# Patient Record
Sex: FEMALE | Race: WHITE | NOT HISPANIC OR LATINO
[De-identification: names, ages, dates, MRNs, and addresses within clinical notes are randomized per-mention and may not be internally consistent; named-entity substitution may affect disease eponyms.]

---

## 2017-02-20 ENCOUNTER — RX RENEWAL (OUTPATIENT)
Age: 82
End: 2017-02-20

## 2017-02-20 DIAGNOSIS — Z86.79 PERSONAL HISTORY OF OTHER DISEASES OF THE CIRCULATORY SYSTEM: ICD-10-CM

## 2017-03-02 ENCOUNTER — APPOINTMENT (OUTPATIENT)
Dept: CARDIOLOGY | Facility: CLINIC | Age: 82
End: 2017-03-02

## 2017-03-02 ENCOUNTER — NON-APPOINTMENT (OUTPATIENT)
Age: 82
End: 2017-03-02

## 2017-03-02 VITALS
WEIGHT: 157 LBS | HEIGHT: 60 IN | BODY MASS INDEX: 30.82 KG/M2 | DIASTOLIC BLOOD PRESSURE: 73 MMHG | HEART RATE: 70 BPM | OXYGEN SATURATION: 96 % | SYSTOLIC BLOOD PRESSURE: 114 MMHG

## 2017-03-02 DIAGNOSIS — Z00.00 ENCOUNTER FOR GENERAL ADULT MEDICAL EXAMINATION W/OUT ABNORMAL FINDINGS: ICD-10-CM

## 2017-07-10 ENCOUNTER — APPOINTMENT (OUTPATIENT)
Dept: DERMATOLOGY | Facility: CLINIC | Age: 82
End: 2017-07-10

## 2017-07-10 VITALS
BODY MASS INDEX: 30.82 KG/M2 | WEIGHT: 157 LBS | HEIGHT: 60 IN | SYSTOLIC BLOOD PRESSURE: 138 MMHG | DIASTOLIC BLOOD PRESSURE: 81 MMHG

## 2017-07-10 DIAGNOSIS — Z86.69 PERSONAL HISTORY OF OTHER DISEASES OF THE NERVOUS SYSTEM AND SENSE ORGANS: ICD-10-CM

## 2017-07-10 DIAGNOSIS — D22.9 MELANOCYTIC NEVI, UNSPECIFIED: ICD-10-CM

## 2017-07-10 DIAGNOSIS — Z87.39 PERSONAL HISTORY OF OTHER DISEASES OF THE MUSCULOSKELETAL SYSTEM AND CONNECTIVE TISSUE: ICD-10-CM

## 2017-07-10 DIAGNOSIS — I78.1 NEVUS, NON-NEOPLASTIC: ICD-10-CM

## 2017-07-10 DIAGNOSIS — L81.4 OTHER MELANIN HYPERPIGMENTATION: ICD-10-CM

## 2017-07-10 DIAGNOSIS — Z56.0 UNEMPLOYMENT, UNSPECIFIED: ICD-10-CM

## 2017-07-10 DIAGNOSIS — H54.7 UNSPECIFIED VISUAL LOSS: ICD-10-CM

## 2017-07-10 DIAGNOSIS — Z87.19 PERSONAL HISTORY OF OTHER DISEASES OF THE DIGESTIVE SYSTEM: ICD-10-CM

## 2017-07-10 DIAGNOSIS — L28.0 LICHEN SIMPLEX CHRONICUS: ICD-10-CM

## 2017-07-10 DIAGNOSIS — Z12.83 ENCOUNTER FOR SCREENING FOR MALIGNANT NEOPLASM OF SKIN: ICD-10-CM

## 2017-07-10 RX ORDER — ALOSETRON 0.5 MG/1
0.5 TABLET ORAL
Refills: 0 | Status: ACTIVE | COMMUNITY

## 2017-07-10 RX ORDER — TRIAMCINOLONE ACETONIDE 1 MG/G
0.1 CREAM TOPICAL
Qty: 1 | Refills: 1 | Status: ACTIVE | COMMUNITY
Start: 2017-07-10 | End: 1900-01-01

## 2017-07-10 RX ORDER — MISOPROSTOL 100 UG/1
100 TABLET ORAL
Refills: 0 | Status: ACTIVE | COMMUNITY

## 2017-07-10 RX ORDER — DIPHENOXYLATE HYDROCHLORIDE AND ATROPINE SULFATE 2.5; .025 MG/1; MG/1
TABLET ORAL
Refills: 0 | Status: ACTIVE | COMMUNITY

## 2017-07-10 SDOH — ECONOMIC STABILITY - INCOME SECURITY: UNEMPLOYMENT, UNSPECIFIED: Z56.0

## 2017-09-18 ENCOUNTER — APPOINTMENT (OUTPATIENT)
Dept: CARDIOLOGY | Facility: CLINIC | Age: 82
End: 2017-09-18
Payer: MEDICARE

## 2017-09-18 ENCOUNTER — RX RENEWAL (OUTPATIENT)
Age: 82
End: 2017-09-18

## 2017-09-18 ENCOUNTER — NON-APPOINTMENT (OUTPATIENT)
Age: 82
End: 2017-09-18

## 2017-09-18 VITALS
SYSTOLIC BLOOD PRESSURE: 107 MMHG | OXYGEN SATURATION: 96 % | HEART RATE: 70 BPM | WEIGHT: 150 LBS | BODY MASS INDEX: 29.3 KG/M2 | DIASTOLIC BLOOD PRESSURE: 72 MMHG

## 2017-09-18 PROCEDURE — 99215 OFFICE O/P EST HI 40 MIN: CPT

## 2017-09-18 PROCEDURE — 93000 ELECTROCARDIOGRAM COMPLETE: CPT

## 2017-09-18 RX ORDER — LATANOPROST/PF 0.005 %
0.01 DROPS OPHTHALMIC (EYE)
Qty: 6 | Refills: 0 | Status: ACTIVE | COMMUNITY
Start: 2016-12-13

## 2017-10-18 ENCOUNTER — RX RENEWAL (OUTPATIENT)
Age: 82
End: 2017-10-18

## 2018-04-16 ENCOUNTER — RX RENEWAL (OUTPATIENT)
Age: 83
End: 2018-04-16

## 2018-09-10 ENCOUNTER — NON-APPOINTMENT (OUTPATIENT)
Age: 83
End: 2018-09-10

## 2018-09-10 ENCOUNTER — APPOINTMENT (OUTPATIENT)
Dept: CARDIOLOGY | Facility: CLINIC | Age: 83
End: 2018-09-10
Payer: MEDICARE

## 2018-09-10 VITALS — SYSTOLIC BLOOD PRESSURE: 112 MMHG | DIASTOLIC BLOOD PRESSURE: 72 MMHG | OXYGEN SATURATION: 95 % | HEART RATE: 90 BPM

## 2018-09-10 VITALS — HEART RATE: 72 BPM

## 2018-09-10 PROCEDURE — 93000 ELECTROCARDIOGRAM COMPLETE: CPT

## 2018-09-10 PROCEDURE — 99214 OFFICE O/P EST MOD 30 MIN: CPT

## 2018-10-15 ENCOUNTER — MEDICATION RENEWAL (OUTPATIENT)
Age: 83
End: 2018-10-15

## 2018-12-03 ENCOUNTER — OUTPATIENT (OUTPATIENT)
Dept: OUTPATIENT SERVICES | Facility: HOSPITAL | Age: 83
LOS: 1 days | Discharge: HOME | End: 2018-12-03
Payer: MEDICARE

## 2018-12-03 DIAGNOSIS — Z90.710 ACQUIRED ABSENCE OF BOTH CERVIX AND UTERUS: Chronic | ICD-10-CM

## 2018-12-03 DIAGNOSIS — Z98.89 OTHER SPECIFIED POSTPROCEDURAL STATES: Chronic | ICD-10-CM

## 2018-12-03 DIAGNOSIS — Z96.652 PRESENCE OF LEFT ARTIFICIAL KNEE JOINT: Chronic | ICD-10-CM

## 2018-12-03 DIAGNOSIS — Z98.49 CATARACT EXTRACTION STATUS, UNSPECIFIED EYE: Chronic | ICD-10-CM

## 2018-12-03 PROCEDURE — 90792 PSYCH DIAG EVAL W/MED SRVCS: CPT

## 2018-12-04 DIAGNOSIS — R45.89 OTHER SYMPTOMS AND SIGNS INVOLVING EMOTIONAL STATE: ICD-10-CM

## 2018-12-04 DIAGNOSIS — G31.84 MILD COGNITIVE IMPAIRMENT OF UNCERTAIN OR UNKNOWN ETIOLOGY: ICD-10-CM

## 2018-12-26 ENCOUNTER — EMERGENCY (EMERGENCY)
Facility: HOSPITAL | Age: 83
LOS: 1 days | Discharge: HOME | End: 2018-12-26
Attending: EMERGENCY MEDICINE
Payer: MEDICARE

## 2018-12-26 VITALS
RESPIRATION RATE: 10 BRPM | HEART RATE: 85 BPM | TEMPERATURE: 99 F | DIASTOLIC BLOOD PRESSURE: 93 MMHG | WEIGHT: 139.99 LBS | OXYGEN SATURATION: 98 % | SYSTOLIC BLOOD PRESSURE: 141 MMHG

## 2018-12-26 DIAGNOSIS — Z90.710 ACQUIRED ABSENCE OF BOTH CERVIX AND UTERUS: Chronic | ICD-10-CM

## 2018-12-26 DIAGNOSIS — Z98.49 CATARACT EXTRACTION STATUS, UNSPECIFIED EYE: Chronic | ICD-10-CM

## 2018-12-26 DIAGNOSIS — Z98.89 OTHER SPECIFIED POSTPROCEDURAL STATES: Chronic | ICD-10-CM

## 2018-12-26 DIAGNOSIS — Z96.652 PRESENCE OF LEFT ARTIFICIAL KNEE JOINT: Chronic | ICD-10-CM

## 2018-12-26 LAB
ALBUMIN SERPL ELPH-MCNC: 4.5 G/DL — SIGNIFICANT CHANGE UP (ref 3.3–5)
ALP SERPL-CCNC: 62 U/L — SIGNIFICANT CHANGE UP (ref 40–120)
ALT FLD-CCNC: 11 U/L — SIGNIFICANT CHANGE UP (ref 10–45)
ANION GAP SERPL CALC-SCNC: 13 MMOL/L — SIGNIFICANT CHANGE UP (ref 5–17)
APPEARANCE UR: CLEAR — SIGNIFICANT CHANGE UP
AST SERPL-CCNC: 21 U/L — SIGNIFICANT CHANGE UP (ref 10–40)
BACTERIA # UR AUTO: NEGATIVE — SIGNIFICANT CHANGE UP
BASOPHILS # BLD AUTO: 0.1 K/UL — SIGNIFICANT CHANGE UP (ref 0–0.2)
BASOPHILS NFR BLD AUTO: 1.2 % — SIGNIFICANT CHANGE UP (ref 0–2)
BILIRUB SERPL-MCNC: 0.5 MG/DL — SIGNIFICANT CHANGE UP (ref 0.2–1.2)
BILIRUB UR-MCNC: NEGATIVE — SIGNIFICANT CHANGE UP
BUN SERPL-MCNC: 12 MG/DL — SIGNIFICANT CHANGE UP (ref 7–23)
CALCIUM SERPL-MCNC: 9.8 MG/DL — SIGNIFICANT CHANGE UP (ref 8.4–10.5)
CHLORIDE SERPL-SCNC: 104 MMOL/L — SIGNIFICANT CHANGE UP (ref 96–108)
CK MB BLD-MCNC: 3.2 % — SIGNIFICANT CHANGE UP (ref 0–3.5)
CK MB CFR SERPL CALC: 3.2 NG/ML — SIGNIFICANT CHANGE UP (ref 0–3.8)
CK SERPL-CCNC: 101 U/L — SIGNIFICANT CHANGE UP (ref 25–170)
CO2 SERPL-SCNC: 27 MMOL/L — SIGNIFICANT CHANGE UP (ref 22–31)
COLOR SPEC: SIGNIFICANT CHANGE UP
CREAT SERPL-MCNC: 0.63 MG/DL — SIGNIFICANT CHANGE UP (ref 0.5–1.3)
DIFF PNL FLD: NEGATIVE — SIGNIFICANT CHANGE UP
EOSINOPHIL # BLD AUTO: 0.2 K/UL — SIGNIFICANT CHANGE UP (ref 0–0.5)
EOSINOPHIL NFR BLD AUTO: 2.4 % — SIGNIFICANT CHANGE UP (ref 0–6)
EPI CELLS # UR: 1 /HPF — SIGNIFICANT CHANGE UP
GAS PNL BLDV: SIGNIFICANT CHANGE UP
GLUCOSE SERPL-MCNC: 74 MG/DL — SIGNIFICANT CHANGE UP (ref 70–99)
GLUCOSE UR QL: NEGATIVE — SIGNIFICANT CHANGE UP
HCT VFR BLD CALC: 45.4 % — HIGH (ref 34.5–45)
HGB BLD-MCNC: 13.8 G/DL — SIGNIFICANT CHANGE UP (ref 11.5–15.5)
HYALINE CASTS # UR AUTO: 1 /LPF — SIGNIFICANT CHANGE UP (ref 0–2)
KETONES UR-MCNC: NEGATIVE — SIGNIFICANT CHANGE UP
LEUKOCYTE ESTERASE UR-ACNC: ABNORMAL
LYMPHOCYTES # BLD AUTO: 2.4 K/UL — SIGNIFICANT CHANGE UP (ref 1–3.3)
LYMPHOCYTES # BLD AUTO: 37.1 % — SIGNIFICANT CHANGE UP (ref 13–44)
MCHC RBC-ENTMCNC: 26.6 PG — LOW (ref 27–34)
MCHC RBC-ENTMCNC: 30.4 GM/DL — LOW (ref 32–36)
MCV RBC AUTO: 87.5 FL — SIGNIFICANT CHANGE UP (ref 80–100)
MONOCYTES # BLD AUTO: 0.7 K/UL — SIGNIFICANT CHANGE UP (ref 0–0.9)
MONOCYTES NFR BLD AUTO: 10.3 % — SIGNIFICANT CHANGE UP (ref 2–14)
NEUTROPHILS # BLD AUTO: 3.2 K/UL — SIGNIFICANT CHANGE UP (ref 1.8–7.4)
NEUTROPHILS NFR BLD AUTO: 49 % — SIGNIFICANT CHANGE UP (ref 43–77)
NITRITE UR-MCNC: NEGATIVE — SIGNIFICANT CHANGE UP
PH UR: 5.5 — SIGNIFICANT CHANGE UP (ref 5–8)
PLATELET # BLD AUTO: 205 K/UL — SIGNIFICANT CHANGE UP (ref 150–400)
POTASSIUM SERPL-MCNC: 3.7 MMOL/L — SIGNIFICANT CHANGE UP (ref 3.5–5.3)
POTASSIUM SERPL-SCNC: 3.7 MMOL/L — SIGNIFICANT CHANGE UP (ref 3.5–5.3)
PROT SERPL-MCNC: 8.1 G/DL — SIGNIFICANT CHANGE UP (ref 6–8.3)
PROT UR-MCNC: NEGATIVE — SIGNIFICANT CHANGE UP
RBC # BLD: 5.19 M/UL — SIGNIFICANT CHANGE UP (ref 3.8–5.2)
RBC # FLD: 12.5 % — SIGNIFICANT CHANGE UP (ref 10.3–14.5)
RBC CASTS # UR COMP ASSIST: 1 /HPF — SIGNIFICANT CHANGE UP (ref 0–4)
SODIUM SERPL-SCNC: 144 MMOL/L — SIGNIFICANT CHANGE UP (ref 135–145)
SP GR SPEC: 1.01 — SIGNIFICANT CHANGE UP (ref 1.01–1.02)
TROPONIN T, HIGH SENSITIVITY RESULT: 26 NG/L — SIGNIFICANT CHANGE UP (ref 0–51)
UROBILINOGEN FLD QL: NEGATIVE — SIGNIFICANT CHANGE UP
WBC # BLD: 6.5 K/UL — SIGNIFICANT CHANGE UP (ref 3.8–10.5)
WBC # FLD AUTO: 6.5 K/UL — SIGNIFICANT CHANGE UP (ref 3.8–10.5)
WBC UR QL: 3 /HPF — SIGNIFICANT CHANGE UP (ref 0–5)

## 2018-12-26 PROCEDURE — 99218: CPT

## 2018-12-26 PROCEDURE — 70450 CT HEAD/BRAIN W/O DYE: CPT | Mod: 26

## 2018-12-26 PROCEDURE — 71046 X-RAY EXAM CHEST 2 VIEWS: CPT | Mod: 26

## 2018-12-26 PROCEDURE — 93010 ELECTROCARDIOGRAM REPORT: CPT

## 2018-12-26 NOTE — ED PROVIDER NOTE - ATTENDING CONTRIBUTION TO CARE
89 yo F h/o AFib no blood thinners, HTN presents after syncopal episode this morning while at home cleaning with no injuries noted, no prior syncope, ekg with afib, labs, ct head, likely cdu bed.

## 2018-12-26 NOTE — ED ADULT NURSE NOTE - NSIMPLEMENTINTERV_GEN_ALL_ED
Implemented All Fall with Harm Risk Interventions:  [place] to call system. Call bell, personal items and telephone within reach. Instruct patient to call for assistance. Room bathroom lighting operational. Non-slip footwear when patient is off stretcher. Physically safe environment: no spills, clutter or unnecessary equipment. Stretcher in lowest position, wheels locked, appropriate side rails in place. Provide visual cue, wrist band, yellow gown, etc. Monitor gait and stability. Monitor for mental status changes and reorient to person, place, and time. Review medications for side effects contributing to fall risk. Reinforce activity limits and safety measures with patient and family. Provide visual clues: red socks.

## 2018-12-26 NOTE — ED ADULT NURSE NOTE - OBJECTIVE STATEMENT
Pt bib dtr after a near syncopal event this morning during which she states she felt lightheaded and started to "see black" and went down to floor, striking her left upper arm, but not her head.  She denies actual loss of consciousness.  There is no bruising of the arm.  She is alert and interactive, denies chest pain or respiratory sx prior to event.

## 2018-12-26 NOTE — ED PROVIDER NOTE - OBJECTIVE STATEMENT
89 yo F h/o AFib no blood thinners, HTN presents after syncopal episode this morning while at home cleaning. Pt lives at home alone and fall was unwitnessed. She was able to ambulate down stairs independently to call daughter who brought her to ED. Pt is not currently complaining of fall, no HA, no dizziness, no N/V/D, no abdominal pain, no neck pain, no motor or sensory deficit, no AMS, no gait or balance instability.

## 2018-12-26 NOTE — ED PROVIDER NOTE - PHYSICAL EXAMINATION
A&Ox3 no apparent distress. No head trauma, no bony tenderness throughout. No midline spine tenderness, CN 2-12 grossly intact without focal neurologic defict. No sensory deficit, no motor deficit. PERRLA, EOMI. Heart RRR no murmur. No JVD. L LE edema 2+ pitting. Lungs CTA b/l no wheezes, rhonchi, rales. Abdomen soft nontender nondistended. Peripheral pulses present and equal b/l. Head NCAT. Skin normal for race.

## 2018-12-26 NOTE — ED ADULT NURSE NOTE - PSH
S/P cataract extraction  bilaterally 1996  S/P colon resection  2012 due to SBO  S/P colonoscopic polypectomy  benign 2010  S/P hemorrhoidectomy  1957  S/P hernia surgery  abdomen  S/P hysterectomy with oophorectomy  1975  S/P knee replacement, left  2012

## 2018-12-26 NOTE — ED ADULT NURSE NOTE - PMH
Cataract cortical, senile, bilateral    Diarrhea in adult patient    Hypercholesteremia    Hypertension    IBS (irritable colon syndrome)    Meningitis, pneumococcal  2000  Osteoarthritis    Osteoporosis    Pedal edema    Phlebitis  left leg hx of.  SBO (small bowel obstruction)    Spinal stenosis of lumbar region    Squamous acanthoma of lip    SVT (supraventricular tachycardia)    Vein, varicose  left leg  Ventral hernia

## 2018-12-26 NOTE — ED ADULT NURSE NOTE - CHPI ED NUR SYMPTOMS NEG
no back pain/no congestion/no chest pain/no vomiting/no nausea/no fever/no chills/no shortness of breath

## 2018-12-27 VITALS
OXYGEN SATURATION: 96 % | TEMPERATURE: 98 F | HEART RATE: 63 BPM | SYSTOLIC BLOOD PRESSURE: 175 MMHG | RESPIRATION RATE: 16 BRPM | DIASTOLIC BLOOD PRESSURE: 92 MMHG

## 2018-12-27 LAB
CULTURE RESULTS: NO GROWTH — SIGNIFICANT CHANGE UP
SPECIMEN SOURCE: SIGNIFICANT CHANGE UP
TROPONIN T, HIGH SENSITIVITY RESULT: 29 NG/L — SIGNIFICANT CHANGE UP (ref 0–51)

## 2018-12-27 PROCEDURE — 82553 CREATINE MB FRACTION: CPT

## 2018-12-27 PROCEDURE — 93005 ELECTROCARDIOGRAM TRACING: CPT

## 2018-12-27 PROCEDURE — 84295 ASSAY OF SERUM SODIUM: CPT

## 2018-12-27 PROCEDURE — 93306 TTE W/DOPPLER COMPLETE: CPT

## 2018-12-27 PROCEDURE — 85014 HEMATOCRIT: CPT

## 2018-12-27 PROCEDURE — 93306 TTE W/DOPPLER COMPLETE: CPT | Mod: 26

## 2018-12-27 PROCEDURE — 81001 URINALYSIS AUTO W/SCOPE: CPT

## 2018-12-27 PROCEDURE — 99217: CPT

## 2018-12-27 PROCEDURE — 70450 CT HEAD/BRAIN W/O DYE: CPT

## 2018-12-27 PROCEDURE — 82330 ASSAY OF CALCIUM: CPT

## 2018-12-27 PROCEDURE — 83605 ASSAY OF LACTIC ACID: CPT

## 2018-12-27 PROCEDURE — 82947 ASSAY GLUCOSE BLOOD QUANT: CPT

## 2018-12-27 PROCEDURE — 82435 ASSAY OF BLOOD CHLORIDE: CPT

## 2018-12-27 PROCEDURE — 71046 X-RAY EXAM CHEST 2 VIEWS: CPT

## 2018-12-27 PROCEDURE — 96374 THER/PROPH/DIAG INJ IV PUSH: CPT

## 2018-12-27 PROCEDURE — 96365 THER/PROPH/DIAG IV INF INIT: CPT | Mod: XU

## 2018-12-27 PROCEDURE — 84484 ASSAY OF TROPONIN QUANT: CPT

## 2018-12-27 PROCEDURE — 84132 ASSAY OF SERUM POTASSIUM: CPT

## 2018-12-27 PROCEDURE — 82803 BLOOD GASES ANY COMBINATION: CPT

## 2018-12-27 PROCEDURE — G0378: CPT

## 2018-12-27 PROCEDURE — 85027 COMPLETE CBC AUTOMATED: CPT

## 2018-12-27 PROCEDURE — 99284 EMERGENCY DEPT VISIT MOD MDM: CPT | Mod: 25

## 2018-12-27 PROCEDURE — 82550 ASSAY OF CK (CPK): CPT

## 2018-12-27 PROCEDURE — 80053 COMPREHEN METABOLIC PANEL: CPT

## 2018-12-27 PROCEDURE — 87086 URINE CULTURE/COLONY COUNT: CPT

## 2018-12-27 RX ORDER — ALOSETRON HYDROCHLORIDE 0.5 MG/1
0 TABLET ORAL
Qty: 0 | Refills: 0 | COMMUNITY

## 2018-12-27 RX ORDER — LATANOPROST 0.05 MG/ML
0 SOLUTION/ DROPS OPHTHALMIC; TOPICAL
Qty: 0 | Refills: 0 | COMMUNITY

## 2018-12-27 RX ORDER — LATANOPROST 0.05 MG/ML
1 SOLUTION/ DROPS OPHTHALMIC; TOPICAL AT BEDTIME
Qty: 0 | Refills: 0 | Status: DISCONTINUED | OUTPATIENT
Start: 2018-12-27 | End: 2018-12-30

## 2018-12-27 RX ORDER — DILTIAZEM HCL 120 MG
120 CAPSULE, EXT RELEASE 24 HR ORAL DAILY
Qty: 0 | Refills: 0 | Status: DISCONTINUED | OUTPATIENT
Start: 2018-12-27 | End: 2018-12-30

## 2018-12-27 RX ORDER — ACETAMINOPHEN 500 MG
1000 TABLET ORAL ONCE
Qty: 0 | Refills: 0 | Status: COMPLETED | OUTPATIENT
Start: 2018-12-27 | End: 2018-12-27

## 2018-12-27 RX ORDER — DIPHENOXYLATE HCL/ATROPINE 2.5-.025MG
1 TABLET ORAL
Qty: 0 | Refills: 0 | Status: DISCONTINUED | OUTPATIENT
Start: 2018-12-27 | End: 2018-12-27

## 2018-12-27 RX ADMIN — Medication 1 TABLET(S): at 12:49

## 2018-12-27 RX ADMIN — Medication 1000 MILLIGRAM(S): at 03:15

## 2018-12-27 RX ADMIN — Medication 400 MILLIGRAM(S): at 02:52

## 2018-12-27 RX ADMIN — Medication 120 MILLIGRAM(S): at 06:00

## 2018-12-27 NOTE — ED CDU PROVIDER SUBSEQUENT DAY NOTE - PROGRESS NOTE DETAILS
CDU NOTE FANG FERNANDES: Pt resting comfortably, feeling well without complaint. states pain is improved. NAD, VSS. Sinus rudy on telemetry. will continue monitoring. pt feeling better - no cp, lightheadedness, dizziness, sob, or focal neuro.  pt states having diarrhea this am.  Awaiting ECHO report. CDU NOTE FANG Puri: pt resting comfortably, feels well without complaint. NAD VSS. no events on tele. CDU NOTE FANG Puri: pt resting comfortably, feels well without complaint. NAD recent VSS. no events on tele.  TTE- no acute/emergent findings. pt stable for d/c per Dr. Trinh.

## 2018-12-27 NOTE — ED ADULT NURSE REASSESSMENT NOTE - NS ED NURSE REASSESS COMMENT FT1
0030-patient received alert & oriented x3. OOB to bathroom with little assist. Noted with slow steady gait. Denies headache, dizziness or sob. Labs drawned as ordered.

## 2018-12-27 NOTE — ED CDU PROVIDER DISPOSITION NOTE - CLINICAL COURSE
89 y/o F with PMH AFib (no a/c), HTN, HLD, IBS, SBO, OA, presents after syncopal episode this morning while at home cleaning. Pt lives at home alone and fall was unwitnessed. pt states she felt lightheaded and weak and fell to the floor, states she hit her side and arm, but not her head. states she was awake the whole time, no LOC. pt states she was able to ambulate down stairs independently after the incident to call daughter who brought her to ED. Pt denies H/A, dizziness, LOC, vision changes, CP, SOB, back pain, N/V/D, abdominal pain, neck pain, AMS, gait or balance instability, problems going to the bathroom.  In ED, trop 26 to 29, all other labs unremarkable, CTH negative, CXR negative. pt sent to CDU for telemetry and TTE.   In CDU, delta trop negative and TTE __________. 89 y/o F with PMH AFib (no a/c), HTN, HLD, IBS, SBO, OA, presents after syncopal episode this morning while at home cleaning. Pt lives at home alone and fall was unwitnessed. pt states she felt lightheaded and weak and fell to the floor, states she hit her side and arm, but not her head. states she was awake the whole time, no LOC. pt states she was able to ambulate down stairs independently after the incident to call daughter who brought her to ED. Pt denies H/A, dizziness, LOC, vision changes, CP, SOB, back pain, N/V/D, abdominal pain, neck pain, AMS, gait or balance instability, problems going to the bathroom.  In ED, trop 26 to 29, all other labs unremarkable, CTH negative, CXR negative. pt sent to CDU for telemetry and TTE.   In CDU, delta trop negative and TTE- no acute/emergent findings. no events on tele.

## 2018-12-27 NOTE — ED CDU PROVIDER INITIAL DAY NOTE - DETAILS
Syncope  - frequent re-eval  - vitals q4hrs,  - tele, CE x 2, TTE  - discussed with attending Dr. Morris

## 2018-12-27 NOTE — ED CDU PROVIDER INITIAL DAY NOTE - OBJECTIVE STATEMENT
89 y/o F with PMH AFib (no a/c), HTN, HLD, IBS, SBO, OA, presents after syncopal episode this morning while at home cleaning. Pt lives at home alone and fall was unwitnessed. pt states she felt lightheaded and weak and fell to the floor, states she hit her side and arm, but not her head. states she was awake the whole time, no LOC. pt states she was able to ambulate down stairs independently after the incident to call daughter who brought her to ED. Pt denies H/A, dizziness, LOC, vision changes, CP, SOB, back pain, N/V/D, abdominal pain, neck pain, AMS, gait or balance instability, problems going to the bathroom.  In ED, trop 26 to 29, all other labs unremarkable, CTH negative, CXR negative. pt sent to CDU for telemetry and TTE.     PMD Dr. Mead (not aff.)

## 2018-12-27 NOTE — ED CDU PROVIDER SUBSEQUENT DAY NOTE - HISTORY
No interval changes since initial CDU provider note. Pt feels well aside from some mild-mod L side pain which is worse with palpation. NAD, VSS. Sinus rudy on tele. delta trop negative. no visible bruising, + ttp L lateral/posterior ribs, CXR was negative. will give IV tylenol and continue monitoring. pt to get TTE in AM. Darcy Urena

## 2019-04-18 ENCOUNTER — MEDICATION RENEWAL (OUTPATIENT)
Age: 84
End: 2019-04-18

## 2019-05-02 ENCOUNTER — APPOINTMENT (OUTPATIENT)
Dept: CARDIOLOGY | Facility: CLINIC | Age: 84
End: 2019-05-02
Payer: MEDICARE

## 2019-05-02 ENCOUNTER — NON-APPOINTMENT (OUTPATIENT)
Age: 84
End: 2019-05-02

## 2019-05-02 VITALS
HEIGHT: 60 IN | HEART RATE: 63 BPM | BODY MASS INDEX: 27.48 KG/M2 | SYSTOLIC BLOOD PRESSURE: 111 MMHG | DIASTOLIC BLOOD PRESSURE: 74 MMHG | WEIGHT: 140 LBS | OXYGEN SATURATION: 96 %

## 2019-05-02 PROCEDURE — 99214 OFFICE O/P EST MOD 30 MIN: CPT

## 2019-05-02 PROCEDURE — 93000 ELECTROCARDIOGRAM COMPLETE: CPT

## 2019-05-02 NOTE — REASON FOR VISIT
[Follow-Up - Clinic] : a clinic follow-up of [Atrial Fibrillation] : atrial fibrillation [Hyperlipidemia] : hyperlipidemia [Palpitations] : palpitations [Supraventricular Tachycardia] : supraventricular tachycardia [Hypertension] : hypertension

## 2019-05-02 NOTE — PHYSICAL EXAM
[Well Groomed] : well groomed [Normal Appearance] : normal appearance [General Appearance - Well Nourished] : well nourished [General Appearance - Well Developed] : well developed [No Deformities] : no deformities [General Appearance - In No Acute Distress] : no acute distress [Normal Conjunctiva] : the conjunctiva exhibited no abnormalities [Eyelids - No Xanthelasma] : the eyelids demonstrated no xanthelasmas [Normal Oral Mucosa] : normal oral mucosa [No Oral Pallor] : no oral pallor [Normal Jugular Venous A Waves Present] : normal jugular venous A waves present [No Oral Cyanosis] : no oral cyanosis [No Jugular Venous Pate A Waves] : no jugular venous pate A waves [Normal Jugular Venous V Waves Present] : normal jugular venous V waves present [Auscultation Breath Sounds / Voice Sounds] : lungs were clear to auscultation bilaterally [Exaggerated Use Of Accessory Muscles For Inspiration] : no accessory muscle use [Respiration, Rhythm And Depth] : normal respiratory rhythm and effort [Heart Rate And Rhythm] : heart rate and rhythm were normal [Murmurs] : no murmurs present [Heart Sounds] : normal S1 and S2 [Abdomen Tenderness] : non-tender [Abdomen Soft] : soft [Abdomen Mass (___ Cm)] : no abdominal mass palpated [Gait - Sufficient For Exercise Testing] : the gait was sufficient for exercise testing [Abnormal Walk] : normal gait [Nail Clubbing] : no clubbing of the fingernails [Petechial Hemorrhages (___cm)] : no petechial hemorrhages [Cyanosis, Localized] : no localized cyanosis [Skin Color & Pigmentation] : normal skin color and pigmentation [Skin Lesions] : no skin lesions [] : no rash [No Venous Stasis] : no venous stasis [No Skin Ulcers] : no skin ulcer [No Xanthoma] : no  xanthoma was observed [Mood] : the mood was normal [Affect] : the affect was normal [Oriented To Time, Place, And Person] : oriented to person, place, and time [No Anxiety] : not feeling anxious [FreeTextEntry1] : b/l ankle edema

## 2019-05-02 NOTE — DISCUSSION/SUMMARY
[Risks] : risks [Patient] : the patient [___ Month(s)] : [unfilled] month(s) [With Me] : with me [FreeTextEntry1] : Patient is an 89-year-old female with history of hypertension (medically managed), hyperlipidemia (stble - diet controlled), small bowel obstruction status post colon resection secondary to adhesions, GI Bleed(7/19/2015) - now stable, status post bilateral knee replacement presented to follow up post recent hospitalization for possible atrial flutter with rapid ventricular response - has been iin NSR since then. \par - Patient is presently found to be in normal sinus rhythm. Patient will be continued on the current regimen off of amiodarone and diltiazem. \par - low sodium diet was encouraged\par - patient was referred for routine blood work\par - BP stable\par - ECG with no new changes

## 2019-05-02 NOTE — HISTORY OF PRESENT ILLNESS
[FreeTextEntry1] : Patient is an 89-year-old female with history of hypertension (medically managed), hyperlipidemia (stable - diet controlled), small bowel obstruction status post colon resection secondary to adhesions, GI Bleed(7/19/2015) - now stable, status post bilateral knee replacement presented to follow up post recent hospitalization for possible atrial flutter with rapid ventricular response - has been iin NSR since then. \par \par Patient initially presented to [facility] on July 7, 2014 for right total knee replacement which was initially canceled due to her palpitations, however done later that same day. Patient states that her palpitations were associated with chest discomfort, shortness of breath and mild dizziness. In [place] patient has been undergoing rehabilitation post total knee replacement and was noted to complain of palpitations and sent to the emergency room on July 14. In the emergency room patient was found to be in supraventricular tachycardia and transferred to CCU at [place].\par \par  Of note, post the TKR she was placed on Xarelto for DVT prophylaxis and upon readmission for the SVT had a course c/b GI bleed, transferred to MICU, endoscopy on 7/19/14 revealing a gastric ulcer with clean bases and was treated with IV Protonix. Based upon the discussion with the daughter, patient was medically stabilized on Amiodarone and Diltiazem and no ablation was done. \par \par Today, the patient feels well-denies chest pain, shortness of breath,  dizziness, lightheadedness or syncope. On rare occasions, she experiences some transient "fluttering"  that lasts a second or two only. \par No long lasting palpitations since being placed on Amio and Diltiazem.\par \par Since her last visit, patient underwent an endoscopy and was found to have complete resolution of her gastric ulcer.

## 2019-05-12 ENCOUNTER — INPATIENT (INPATIENT)
Facility: HOSPITAL | Age: 84
LOS: 2 days | Discharge: NURSING, REHAB OR LONG-TERM CARE | End: 2019-05-15
Attending: INTERNAL MEDICINE | Admitting: INTERNAL MEDICINE
Payer: MEDICARE

## 2019-05-12 VITALS
TEMPERATURE: 98 F | OXYGEN SATURATION: 98 % | DIASTOLIC BLOOD PRESSURE: 89 MMHG | HEART RATE: 84 BPM | SYSTOLIC BLOOD PRESSURE: 129 MMHG | HEIGHT: 64 IN | RESPIRATION RATE: 17 BRPM | WEIGHT: 149.91 LBS

## 2019-05-12 DIAGNOSIS — M47.897 OTHER SPONDYLOSIS, LUMBOSACRAL REGION: ICD-10-CM

## 2019-05-12 DIAGNOSIS — M48.061 SPINAL STENOSIS, LUMBAR REGION WITHOUT NEUROGENIC CLAUDICATION: ICD-10-CM

## 2019-05-12 DIAGNOSIS — M53.87 OTHER SPECIFIED DORSOPATHIES, LUMBOSACRAL REGION: ICD-10-CM

## 2019-05-12 DIAGNOSIS — K25.9 GASTRIC ULCER, UNSPECIFIED AS ACUTE OR CHRONIC, WITHOUT HEMORRHAGE OR PERFORATION: ICD-10-CM

## 2019-05-12 DIAGNOSIS — Z90.710 ACQUIRED ABSENCE OF BOTH CERVIX AND UTERUS: Chronic | ICD-10-CM

## 2019-05-12 DIAGNOSIS — Z98.89 OTHER SPECIFIED POSTPROCEDURAL STATES: Chronic | ICD-10-CM

## 2019-05-12 DIAGNOSIS — Z96.652 PRESENCE OF LEFT ARTIFICIAL KNEE JOINT: Chronic | ICD-10-CM

## 2019-05-12 DIAGNOSIS — I10 ESSENTIAL (PRIMARY) HYPERTENSION: ICD-10-CM

## 2019-05-12 DIAGNOSIS — Z98.49 CATARACT EXTRACTION STATUS, UNSPECIFIED EYE: Chronic | ICD-10-CM

## 2019-05-12 DIAGNOSIS — M51.36 OTHER INTERVERTEBRAL DISC DEGENERATION, LUMBAR REGION: ICD-10-CM

## 2019-05-12 LAB
ALBUMIN SERPL ELPH-MCNC: 3.8 G/DL — SIGNIFICANT CHANGE UP (ref 3.3–5)
ALP SERPL-CCNC: 59 U/L — SIGNIFICANT CHANGE UP (ref 40–120)
ALT FLD-CCNC: 11 U/L — LOW (ref 12–78)
ANION GAP SERPL CALC-SCNC: 9 MMOL/L — SIGNIFICANT CHANGE UP (ref 5–17)
APPEARANCE UR: CLEAR — SIGNIFICANT CHANGE UP
AST SERPL-CCNC: 27 U/L — SIGNIFICANT CHANGE UP (ref 15–37)
BACTERIA # UR AUTO: ABNORMAL
BASOPHILS # BLD AUTO: 0.05 K/UL — SIGNIFICANT CHANGE UP (ref 0–0.2)
BASOPHILS NFR BLD AUTO: 0.7 % — SIGNIFICANT CHANGE UP (ref 0–2)
BILIRUB SERPL-MCNC: 1 MG/DL — SIGNIFICANT CHANGE UP (ref 0.2–1.2)
BILIRUB UR-MCNC: NEGATIVE — SIGNIFICANT CHANGE UP
BUN SERPL-MCNC: 16 MG/DL — SIGNIFICANT CHANGE UP (ref 7–23)
CALCIUM SERPL-MCNC: 9.1 MG/DL — SIGNIFICANT CHANGE UP (ref 8.5–10.1)
CHLORIDE SERPL-SCNC: 108 MMOL/L — SIGNIFICANT CHANGE UP (ref 96–108)
CO2 SERPL-SCNC: 27 MMOL/L — SIGNIFICANT CHANGE UP (ref 22–31)
COLOR SPEC: YELLOW — SIGNIFICANT CHANGE UP
CREAT SERPL-MCNC: 0.78 MG/DL — SIGNIFICANT CHANGE UP (ref 0.5–1.3)
DIFF PNL FLD: ABNORMAL
EOSINOPHIL # BLD AUTO: 0.11 K/UL — SIGNIFICANT CHANGE UP (ref 0–0.5)
EOSINOPHIL NFR BLD AUTO: 1.6 % — SIGNIFICANT CHANGE UP (ref 0–6)
ERYTHROCYTE [SEDIMENTATION RATE] IN BLOOD: 12 MM/HR — SIGNIFICANT CHANGE UP (ref 0–20)
GLUCOSE SERPL-MCNC: 83 MG/DL — SIGNIFICANT CHANGE UP (ref 70–99)
GLUCOSE UR QL: NEGATIVE MG/DL — SIGNIFICANT CHANGE UP
HCT VFR BLD CALC: 46.5 % — HIGH (ref 34.5–45)
HGB BLD-MCNC: 15 G/DL — SIGNIFICANT CHANGE UP (ref 11.5–15.5)
IMM GRANULOCYTES NFR BLD AUTO: 0.1 % — SIGNIFICANT CHANGE UP (ref 0–1.5)
KETONES UR-MCNC: NEGATIVE — SIGNIFICANT CHANGE UP
LEUKOCYTE ESTERASE UR-ACNC: ABNORMAL
LYMPHOCYTES # BLD AUTO: 1.8 K/UL — SIGNIFICANT CHANGE UP (ref 1–3.3)
LYMPHOCYTES # BLD AUTO: 26.9 % — SIGNIFICANT CHANGE UP (ref 13–44)
MCHC RBC-ENTMCNC: 28.2 PG — SIGNIFICANT CHANGE UP (ref 27–34)
MCHC RBC-ENTMCNC: 32.3 GM/DL — SIGNIFICANT CHANGE UP (ref 32–36)
MCV RBC AUTO: 87.6 FL — SIGNIFICANT CHANGE UP (ref 80–100)
MONOCYTES # BLD AUTO: 0.76 K/UL — SIGNIFICANT CHANGE UP (ref 0–0.9)
MONOCYTES NFR BLD AUTO: 11.4 % — SIGNIFICANT CHANGE UP (ref 2–14)
NEUTROPHILS # BLD AUTO: 3.95 K/UL — SIGNIFICANT CHANGE UP (ref 1.8–7.4)
NEUTROPHILS NFR BLD AUTO: 59.3 % — SIGNIFICANT CHANGE UP (ref 43–77)
NITRITE UR-MCNC: NEGATIVE — SIGNIFICANT CHANGE UP
NRBC # BLD: 0 /100 WBCS — SIGNIFICANT CHANGE UP (ref 0–0)
PH UR: 7 — SIGNIFICANT CHANGE UP (ref 5–8)
PLATELET # BLD AUTO: 210 K/UL — SIGNIFICANT CHANGE UP (ref 150–400)
POTASSIUM SERPL-MCNC: 3.7 MMOL/L — SIGNIFICANT CHANGE UP (ref 3.5–5.3)
POTASSIUM SERPL-SCNC: 3.7 MMOL/L — SIGNIFICANT CHANGE UP (ref 3.5–5.3)
PROT SERPL-MCNC: 7.7 GM/DL — SIGNIFICANT CHANGE UP (ref 6–8.3)
PROT UR-MCNC: 15 MG/DL
RBC # BLD: 5.31 M/UL — HIGH (ref 3.8–5.2)
RBC # FLD: 13.4 % — SIGNIFICANT CHANGE UP (ref 10.3–14.5)
RBC CASTS # UR COMP ASSIST: SIGNIFICANT CHANGE UP /HPF (ref 0–4)
SODIUM SERPL-SCNC: 144 MMOL/L — SIGNIFICANT CHANGE UP (ref 135–145)
SP GR SPEC: 1 — LOW (ref 1.01–1.02)
UROBILINOGEN FLD QL: NEGATIVE MG/DL — SIGNIFICANT CHANGE UP
WBC # BLD: 6.68 K/UL — SIGNIFICANT CHANGE UP (ref 3.8–10.5)
WBC # FLD AUTO: 6.68 K/UL — SIGNIFICANT CHANGE UP (ref 3.8–10.5)
WBC UR QL: SIGNIFICANT CHANGE UP

## 2019-05-12 PROCEDURE — 72131 CT LUMBAR SPINE W/O DYE: CPT | Mod: 26

## 2019-05-12 PROCEDURE — 99285 EMERGENCY DEPT VISIT HI MDM: CPT

## 2019-05-12 PROCEDURE — 76377 3D RENDER W/INTRP POSTPROCES: CPT | Mod: 26

## 2019-05-12 RX ORDER — KETOROLAC TROMETHAMINE 30 MG/ML
15 SYRINGE (ML) INJECTION ONCE
Refills: 0 | Status: DISCONTINUED | OUTPATIENT
Start: 2019-05-12 | End: 2019-05-12

## 2019-05-12 RX ORDER — FENTANYL CITRATE 50 UG/ML
50 INJECTION INTRAVENOUS ONCE
Refills: 0 | Status: DISCONTINUED | OUTPATIENT
Start: 2019-05-12 | End: 2019-05-12

## 2019-05-12 RX ORDER — DOCUSATE SODIUM 100 MG
100 CAPSULE ORAL THREE TIMES A DAY
Refills: 0 | Status: DISCONTINUED | OUTPATIENT
Start: 2019-05-12 | End: 2019-05-15

## 2019-05-12 RX ORDER — PANTOPRAZOLE SODIUM 20 MG/1
40 TABLET, DELAYED RELEASE ORAL
Refills: 0 | Status: DISCONTINUED | OUTPATIENT
Start: 2019-05-12 | End: 2019-05-15

## 2019-05-12 RX ORDER — DILTIAZEM HCL 120 MG
120 CAPSULE, EXT RELEASE 24 HR ORAL DAILY
Refills: 0 | Status: DISCONTINUED | OUTPATIENT
Start: 2019-05-12 | End: 2019-05-15

## 2019-05-12 RX ORDER — ACETAMINOPHEN 500 MG
1000 TABLET ORAL ONCE
Refills: 0 | Status: COMPLETED | OUTPATIENT
Start: 2019-05-12 | End: 2019-05-12

## 2019-05-12 RX ORDER — ENOXAPARIN SODIUM 100 MG/ML
40 INJECTION SUBCUTANEOUS DAILY
Refills: 0 | Status: DISCONTINUED | OUTPATIENT
Start: 2019-05-12 | End: 2019-05-15

## 2019-05-12 RX ORDER — IBUPROFEN 200 MG
600 TABLET ORAL
Refills: 0 | Status: DISCONTINUED | OUTPATIENT
Start: 2019-05-12 | End: 2019-05-15

## 2019-05-12 RX ORDER — LATANOPROST 0.05 MG/ML
1 SOLUTION/ DROPS OPHTHALMIC; TOPICAL AT BEDTIME
Refills: 0 | Status: DISCONTINUED | OUTPATIENT
Start: 2019-05-12 | End: 2019-05-15

## 2019-05-12 RX ORDER — LATANOPROST 0.05 MG/ML
1 SOLUTION/ DROPS OPHTHALMIC; TOPICAL
Qty: 0 | Refills: 0 | DISCHARGE

## 2019-05-12 RX ORDER — DICLOFENAC SODIUM 75 MG/1
0 TABLET, DELAYED RELEASE ORAL
Qty: 0 | Refills: 0 | DISCHARGE

## 2019-05-12 RX ORDER — MORPHINE SULFATE 50 MG/1
2 CAPSULE, EXTENDED RELEASE ORAL EVERY 6 HOURS
Refills: 0 | Status: DISCONTINUED | OUTPATIENT
Start: 2019-05-12 | End: 2019-05-13

## 2019-05-12 RX ADMIN — Medication 600 MILLIGRAM(S): at 17:37

## 2019-05-12 RX ADMIN — MORPHINE SULFATE 2 MILLIGRAM(S): 50 CAPSULE, EXTENDED RELEASE ORAL at 20:26

## 2019-05-12 RX ADMIN — FENTANYL CITRATE 50 MICROGRAM(S): 50 INJECTION INTRAVENOUS at 09:43

## 2019-05-12 RX ADMIN — Medication 1000 MILLIGRAM(S): at 10:00

## 2019-05-12 RX ADMIN — LATANOPROST 1 DROP(S): 0.05 SOLUTION/ DROPS OPHTHALMIC; TOPICAL at 22:59

## 2019-05-12 RX ADMIN — Medication 600 MILLIGRAM(S): at 18:30

## 2019-05-12 RX ADMIN — Medication 1000 MILLIGRAM(S): at 09:48

## 2019-05-12 RX ADMIN — MORPHINE SULFATE 2 MILLIGRAM(S): 50 CAPSULE, EXTENDED RELEASE ORAL at 19:56

## 2019-05-12 RX ADMIN — Medication 1 TABLET(S): at 13:14

## 2019-05-12 RX ADMIN — ENOXAPARIN SODIUM 40 MILLIGRAM(S): 100 INJECTION SUBCUTANEOUS at 13:15

## 2019-05-12 RX ADMIN — Medication 100 MILLIGRAM(S): at 22:19

## 2019-05-12 RX ADMIN — Medication 15 MILLIGRAM(S): at 07:50

## 2019-05-12 RX ADMIN — Medication 15 MILLIGRAM(S): at 08:20

## 2019-05-12 RX ADMIN — MORPHINE SULFATE 2 MILLIGRAM(S): 50 CAPSULE, EXTENDED RELEASE ORAL at 13:15

## 2019-05-12 RX ADMIN — Medication 4 MILLIGRAM(S): at 07:50

## 2019-05-12 RX ADMIN — Medication 24 MILLIGRAM(S): at 17:37

## 2019-05-12 RX ADMIN — Medication 400 MILLIGRAM(S): at 09:02

## 2019-05-12 RX ADMIN — FENTANYL CITRATE 50 MICROGRAM(S): 50 INJECTION INTRAVENOUS at 13:48

## 2019-05-12 NOTE — H&P ADULT - NSICDXPASTMEDICALHX_GEN_ALL_CORE_FT
PAST MEDICAL HISTORY:  Cataract cortical, senile, bilateral     Diarrhea in adult patient     Hypercholesteremia     Hypertension     IBS (irritable colon syndrome)     Meningitis, pneumococcal 2000    Osteoarthritis     Osteoporosis     Pedal edema     Phlebitis left leg hx of.    SBO (small bowel obstruction)     Spinal stenosis of lumbar region     Squamous acanthoma of lip     SVT (supraventricular tachycardia)     Vein, varicose left leg    Ventral hernia

## 2019-05-12 NOTE — ED PROVIDER NOTE - CLINICAL SUMMARY MEDICAL DECISION MAKING FREE TEXT BOX
LOW back pain with sciatica features refractory to pain meds and in need of pain control and rehab/PT

## 2019-05-12 NOTE — ED PROVIDER NOTE - PHYSICAL EXAMINATION
[place]:  General: No distress.  Mentation at baseline.   HEENT: WNL  Chest/Lungs: CTAB, No wheeze, No retractions, No increased work of breathing, Normal rate  Heart: S1S2 RRR, No M/R/G, Pules equal Bilaterally in upper and lower extremities distally  Abd: soft, NT/ND, No guarding, No rebound.  No hernias, no palpable masses.  Extrem: FROM in all joints, no significant edema noted, No ulcers.  Cap refil < 2sec.  Skin: No rash noted, warm dry.  Neuro:  Grossly normal.  No difficulty ambulating. No focal deficits.  Psychiatric: No evidence of delusions. No SI/HI.

## 2019-05-12 NOTE — H&P ADULT - NSHPPHYSICALEXAM_GEN_ALL_CORE
PHYSICAL EXAM:  GENERAL: NAD, well-groomed, well-developed  HEAD:  Atraumatic, Normocephalic  EYES: EOMI, PERRLA, conjunctiva and sclera clear  ENMT: No tonsillar erythema, exudates, or enlargement; Moist mucous membranes, No lesions  NECK: Supple, No JVD, Normal thyroid  NERVOUS SYSTEM:  Alert & Oriented X3; Motor Strength 4/5 LLE.  + L SLR  CHEST/LUNG: Clear bilaterally; No rales, rhonchi, wheezing, or rubs  HEART: Regular rate and rhythm; No murmurs, rubs, or gallops  ABDOMEN: Soft, Nontender, Nondistended; Bowel sounds present  EXTREMITIES:  2+ Peripheral Pulses, No clubbing, cyanosis, or edema  Back;  Tenderness L-S spine.  LYMPH: No lymphadenopathy noted  SKIN: No rashes or lesions

## 2019-05-12 NOTE — H&P ADULT - NSICDXPASTSURGICALHX_GEN_ALL_CORE_FT
PAST SURGICAL HISTORY:  S/P cataract extraction bilaterally 1996    S/P colon resection 2012 due to SBO    S/P colonoscopic polypectomy benign 2010    S/P hemorrhoidectomy 1957    S/P hernia surgery abdomen    S/P hysterectomy with oophorectomy 1975    S/P knee replacement, left 2012

## 2019-05-12 NOTE — PATIENT PROFILE ADULT - NSPROHMCARDIOMGMTSTRAT_GEN_A_NUR
coping strategies/medication therapy/routine screening/weight management/adequate rest/diet modification/exercise

## 2019-05-12 NOTE — H&P ADULT - NSHPLABSRESULTS_GEN_ALL_CORE
LABS:                        15.0   6.68  )-----------( 210      ( 12 May 2019 07:52 )             46.5     05-12    144  |  108  |  16  ----------------------------<  83  3.7   |  27  |  0.78    Ca    9.1      12 May 2019 07:52    TPro  7.7  /  Alb  3.8  /  TBili  1.0  /  DBili  x   /  AST  27  /  ALT  11<L>  /  AlkPhos  59  05-12      Urinalysis Basic - ( 12 May 2019 09:05 )    Color: Yellow / Appearance: Clear / S.005 / pH: x  Gluc: x / Ketone: Negative  / Bili: Negative / Urobili: Negative mg/dL   Blood: x / Protein: 15 mg/dL / Nitrite: Negative   Leuk Esterase: Trace / RBC: 0-2 /HPF / WBC 0-2   Sq Epi: x / Non Sq Epi: x / Bacteria: Few      CAPILLARY BLOOD GLUCOSE    < from: CT Lumbar Spine No Cont (19 @ 08:23) >      Diffuse multilevel degenerative changes with osteopeniaas described   above multilevel canal and foraminal narrowing.    If symptoms persist, and additional imaging evaluation is needed,   follow-up MRI is suggested.      < end of copied text >

## 2019-05-12 NOTE — H&P ADULT - HISTORY OF PRESENT ILLNESS
89yo, WF with h/o HTN, DDD, OA presents with c/o increasing L sided LBP for 3 days, sharp radiating down L leg.  Associated with weakness and unable to ambulate.  Denies CP, SOB, cough, palpitation, n/v/d, fever, chills, abdominal pain, dysuria.

## 2019-05-12 NOTE — ED ADULT TRIAGE NOTE - CHIEF COMPLAINT QUOTE
Patient reports "Left hip, buttock pain radiating down left leg. Pain started Friday and has not subsided." denies falls. Denies injury or trauma.

## 2019-05-12 NOTE — ED ADULT NURSE NOTE - OBJECTIVE STATEMENT
pt A&Ox3 with complaints of left hip pain radiating to the knee. that started 3 days ago. PMH doug Morelos

## 2019-05-13 PROCEDURE — 72148 MRI LUMBAR SPINE W/O DYE: CPT | Mod: 26

## 2019-05-13 RX ORDER — OXYCODONE AND ACETAMINOPHEN 5; 325 MG/1; MG/1
1 TABLET ORAL EVERY 4 HOURS
Refills: 0 | Status: DISCONTINUED | OUTPATIENT
Start: 2019-05-13 | End: 2019-05-15

## 2019-05-13 RX ADMIN — Medication 100 MILLIGRAM(S): at 13:06

## 2019-05-13 RX ADMIN — OXYCODONE AND ACETAMINOPHEN 1 TABLET(S): 5; 325 TABLET ORAL at 14:06

## 2019-05-13 RX ADMIN — Medication 4 MILLIGRAM(S): at 07:25

## 2019-05-13 RX ADMIN — Medication 600 MILLIGRAM(S): at 05:46

## 2019-05-13 RX ADMIN — Medication 600 MILLIGRAM(S): at 17:06

## 2019-05-13 RX ADMIN — Medication 100 MILLIGRAM(S): at 21:37

## 2019-05-13 RX ADMIN — Medication 4 MILLIGRAM(S): at 13:06

## 2019-05-13 RX ADMIN — Medication 100 MILLIGRAM(S): at 05:16

## 2019-05-13 RX ADMIN — ENOXAPARIN SODIUM 40 MILLIGRAM(S): 100 INJECTION SUBCUTANEOUS at 11:06

## 2019-05-13 RX ADMIN — Medication 600 MILLIGRAM(S): at 05:16

## 2019-05-13 RX ADMIN — Medication 30 MILLILITER(S): at 00:51

## 2019-05-13 RX ADMIN — Medication 120 MILLIGRAM(S): at 05:16

## 2019-05-13 RX ADMIN — OXYCODONE AND ACETAMINOPHEN 1 TABLET(S): 5; 325 TABLET ORAL at 13:06

## 2019-05-13 RX ADMIN — LATANOPROST 1 DROP(S): 0.05 SOLUTION/ DROPS OPHTHALMIC; TOPICAL at 21:37

## 2019-05-13 RX ADMIN — Medication 1 TABLET(S): at 11:06

## 2019-05-13 RX ADMIN — Medication 600 MILLIGRAM(S): at 18:06

## 2019-05-13 RX ADMIN — Medication 4 MILLIGRAM(S): at 17:07

## 2019-05-13 RX ADMIN — Medication 8 MILLIGRAM(S): at 21:37

## 2019-05-13 RX ADMIN — PANTOPRAZOLE SODIUM 40 MILLIGRAM(S): 20 TABLET, DELAYED RELEASE ORAL at 07:25

## 2019-05-13 NOTE — PHYSICAL THERAPY INITIAL EVALUATION ADULT - BALANCE TRAINING, PT EVAL
pt will increase sitting and standing static and dynamic balance for safety with ambulation, ADLs and transfers x4 weeks

## 2019-05-13 NOTE — PHYSICAL THERAPY INITIAL EVALUATION ADULT - ADDITIONAL COMMENTS
Pt is R hand dominant, wears reading glasses, has a cane (used mostly) and a rolling walker (not used and in good condition). pt is a community ambulator, does not drive. Pt lives in an apartment, there are 4 steps to enter with B handrails (wide apart), once inside there are no further steps to negotiate. Pt with (-) hx of falls, decreased ambulation tolerance, no recent PT, but has had PT in the past for low back pain and B TKA.

## 2019-05-13 NOTE — PHYSICAL THERAPY INITIAL EVALUATION ADULT - GAIT TRAINING, PT EVAL
pt will be able to ambulate 1200 feet with appropriate device for return to community ambulation x4 weeks

## 2019-05-13 NOTE — PHYSICAL THERAPY INITIAL EVALUATION ADULT - PERTINENT HX OF CURRENT PROBLEM, REHAB EVAL
89yo, WF with h/o HTN, DDD, OA presents with c/o increasing L sided LBP for 3 days, sharp radiating down L leg.  Associated with weakness and unable to ambulate.

## 2019-05-13 NOTE — PHYSICAL THERAPY INITIAL EVALUATION ADULT - GAIT DEVIATIONS NOTED, PT EVAL
increased time in double stance/decreased weight-shifting ability/decreased step length/decreased stride length/Poor veronica/decreased veronica

## 2019-05-14 ENCOUNTER — TRANSCRIPTION ENCOUNTER (OUTPATIENT)
Age: 84
End: 2019-05-14

## 2019-05-14 DIAGNOSIS — M53.87 OTHER SPECIFIED DORSOPATHIES, LUMBOSACRAL REGION: ICD-10-CM

## 2019-05-14 LAB
ANION GAP SERPL CALC-SCNC: 8 MMOL/L — SIGNIFICANT CHANGE UP (ref 5–17)
BUN SERPL-MCNC: 24 MG/DL — HIGH (ref 7–23)
CALCIUM SERPL-MCNC: 9.1 MG/DL — SIGNIFICANT CHANGE UP (ref 8.5–10.1)
CHLORIDE SERPL-SCNC: 107 MMOL/L — SIGNIFICANT CHANGE UP (ref 96–108)
CO2 SERPL-SCNC: 26 MMOL/L — SIGNIFICANT CHANGE UP (ref 22–31)
CREAT SERPL-MCNC: 0.66 MG/DL — SIGNIFICANT CHANGE UP (ref 0.5–1.3)
GLUCOSE SERPL-MCNC: 103 MG/DL — HIGH (ref 70–99)
HCT VFR BLD CALC: 43.3 % — SIGNIFICANT CHANGE UP (ref 34.5–45)
HGB BLD-MCNC: 14.2 G/DL — SIGNIFICANT CHANGE UP (ref 11.5–15.5)
MCHC RBC-ENTMCNC: 28.3 PG — SIGNIFICANT CHANGE UP (ref 27–34)
MCHC RBC-ENTMCNC: 32.8 GM/DL — SIGNIFICANT CHANGE UP (ref 32–36)
MCV RBC AUTO: 86.3 FL — SIGNIFICANT CHANGE UP (ref 80–100)
NRBC # BLD: 0 /100 WBCS — SIGNIFICANT CHANGE UP (ref 0–0)
PLATELET # BLD AUTO: 237 K/UL — SIGNIFICANT CHANGE UP (ref 150–400)
POTASSIUM SERPL-MCNC: 3.8 MMOL/L — SIGNIFICANT CHANGE UP (ref 3.5–5.3)
POTASSIUM SERPL-SCNC: 3.8 MMOL/L — SIGNIFICANT CHANGE UP (ref 3.5–5.3)
RBC # BLD: 5.02 M/UL — SIGNIFICANT CHANGE UP (ref 3.8–5.2)
RBC # FLD: 13.6 % — SIGNIFICANT CHANGE UP (ref 10.3–14.5)
SODIUM SERPL-SCNC: 141 MMOL/L — SIGNIFICANT CHANGE UP (ref 135–145)
WBC # BLD: 8.66 K/UL — SIGNIFICANT CHANGE UP (ref 3.8–10.5)
WBC # FLD AUTO: 8.66 K/UL — SIGNIFICANT CHANGE UP (ref 3.8–10.5)

## 2019-05-14 RX ORDER — ALOSETRON HYDROCHLORIDE 0.5 MG/1
5 TABLET ORAL
Qty: 0 | Refills: 0 | DISCHARGE

## 2019-05-14 RX ORDER — PANTOPRAZOLE SODIUM 20 MG/1
1 TABLET, DELAYED RELEASE ORAL
Qty: 0 | Refills: 0 | DISCHARGE
Start: 2019-05-14

## 2019-05-14 RX ORDER — IBUPROFEN 200 MG
1 TABLET ORAL
Qty: 0 | Refills: 0 | DISCHARGE
Start: 2019-05-14

## 2019-05-14 RX ORDER — DILTIAZEM HCL 120 MG
1 CAPSULE, EXT RELEASE 24 HR ORAL
Qty: 0 | Refills: 0 | DISCHARGE
Start: 2019-05-14

## 2019-05-14 RX ADMIN — Medication 600 MILLIGRAM(S): at 18:04

## 2019-05-14 RX ADMIN — Medication 120 MILLIGRAM(S): at 05:29

## 2019-05-14 RX ADMIN — Medication 600 MILLIGRAM(S): at 05:29

## 2019-05-14 RX ADMIN — OXYCODONE AND ACETAMINOPHEN 1 TABLET(S): 5; 325 TABLET ORAL at 22:56

## 2019-05-14 RX ADMIN — Medication 100 MILLIGRAM(S): at 13:36

## 2019-05-14 RX ADMIN — ENOXAPARIN SODIUM 40 MILLIGRAM(S): 100 INJECTION SUBCUTANEOUS at 11:16

## 2019-05-14 RX ADMIN — LATANOPROST 1 DROP(S): 0.05 SOLUTION/ DROPS OPHTHALMIC; TOPICAL at 22:26

## 2019-05-14 RX ADMIN — Medication 4 MILLIGRAM(S): at 08:13

## 2019-05-14 RX ADMIN — Medication 4 MILLIGRAM(S): at 22:26

## 2019-05-14 RX ADMIN — OXYCODONE AND ACETAMINOPHEN 1 TABLET(S): 5; 325 TABLET ORAL at 04:51

## 2019-05-14 RX ADMIN — Medication 1 TABLET(S): at 13:36

## 2019-05-14 RX ADMIN — Medication 100 MILLIGRAM(S): at 05:29

## 2019-05-14 RX ADMIN — PANTOPRAZOLE SODIUM 40 MILLIGRAM(S): 20 TABLET, DELAYED RELEASE ORAL at 08:13

## 2019-05-14 RX ADMIN — Medication 4 MILLIGRAM(S): at 13:36

## 2019-05-14 RX ADMIN — Medication 600 MILLIGRAM(S): at 17:04

## 2019-05-14 RX ADMIN — Medication 4 MILLIGRAM(S): at 17:04

## 2019-05-14 RX ADMIN — Medication 100 MILLIGRAM(S): at 22:26

## 2019-05-14 RX ADMIN — OXYCODONE AND ACETAMINOPHEN 1 TABLET(S): 5; 325 TABLET ORAL at 22:26

## 2019-05-14 NOTE — DISCHARGE NOTE PROVIDER - CARE PROVIDER_API CALL
Mar[place])  [place] [address]  Phone: [phone]  Fax: [phone]  Follow Up Time: 1 month

## 2019-05-14 NOTE — DISCHARGE NOTE PROVIDER - HOSPITAL COURSE
89yo, WF with h/o HTN, DDD, OA presents with c/o increasing L sided LBP for 3 days, sharp radiating down L leg.  Associated with weakness and unable to ambulate.  Denies CP, SOB, cough, palpitation, n/v/d, fever, chills, abdominal pain, dysuria.     Admitted Sciatic due to L-S spine stenosis with DDD, OA, HTN, treated with pain management and Medrol dose danyel, with improvement.    The patient remained medically stable and discharged to Rehab.

## 2019-05-14 NOTE — DISCHARGE NOTE PROVIDER - NSDCCPGOAL_GEN_ALL_CORE_FT
To get better and follow your care plan as instructed.  Rehab, PT    Please follow up with your primary care physician regarding your hospitalization in 1 week.

## 2019-05-14 NOTE — PROGRESS NOTE ADULT - ATTENDING COMMENTS
Continue current care and treatment.
Discharge planning for Rehab.  Continue current care and treatment.

## 2019-05-14 NOTE — PROGRESS NOTE ADULT - SUBJECTIVE AND OBJECTIVE BOX
Patient is a 89y old  Female who presents with a chief complaint of Unable to walk with back and leg pain. (12 May 2019 14:24)      SUBJECTIVE/OBJECTIVE:    c/o L leg pain      MEDICATIONS  (STANDING):  diltiazem    milliGRAM(s) Oral daily  docusate sodium 100 milliGRAM(s) Oral three times a day  enoxaparin Injectable 40 milliGRAM(s) SubCutaneous daily  ibuprofen  Tablet. 600 milliGRAM(s) Oral two times a day  latanoprost 0.005% Ophthalmic Solution 1 Drop(s) Both EYES at bedtime  methylPREDNISolone   Oral   methylPREDNISolone 4 milliGRAM(s) Oral before breakfast  methylPREDNISolone 4 milliGRAM(s) Oral after lunch  methylPREDNISolone 4 milliGRAM(s) Oral after dinner  methylPREDNISolone 8 milliGRAM(s) Oral at bedtime  multivitamin 1 Tablet(s) Oral daily  pantoprazole    Tablet 40 milliGRAM(s) Oral before breakfast    MEDICATIONS  (PRN):  oxyCODONE    5 mG/acetaminophen 325 mG 1 Tablet(s) Oral every 4 hours PRN Moderate Pain (4 - 6)      Allergies    No Known Allergies    Intolerances          Vital Signs Last 24 Hrs  T(C): 36.8 (13 May 2019 05:10), Max: 37.1 (12 May 2019 17:39)  T(F): 98.3 (13 May 2019 05:10), Max: 98.8 (12 May 2019 17:39)  HR: 76 (13 May 2019 05:10) (62 - 79)  BP: 133/71 (13 May 2019 05:10) (121/63 - 151/74)  BP(mean): --  RR: 18 (13 May 2019 05:10) (18 - 18)  SpO2: 97% (13 May 2019 05:10) (97% - 100%)    Physical Exam:  Physical Exam: PHYSICAL EXAM:  GENERAL: NAD, well-groomed, well-developed  HEAD:  Atraumatic, Normocephalic  EYES: EOMI, PERRLA, conjunctiva and sclera clear  ENMT: No tonsillar erythema, exudates, or enlargement; Moist mucous membranes, No lesions  NECK: Supple, No JVD, Normal thyroid  NERVOUS SYSTEM:  Alert & Oriented X3; Motor Strength 4/5 LLE.  + L SLR  CHEST/LUNG: Clear bilaterally; No rales, rhonchi, wheezing, or rubs  HEART: Regular rate and rhythm; No murmurs, rubs, or gallops  ABDOMEN: Soft, Nontender, Nondistended; Bowel sounds present  EXTREMITIES:  2+ Peripheral Pulses, No clubbing, cyanosis, or edema  Back;  Tenderness L-S spine.  LYMPH: No lymphadenopathy noted SKIN: No rashes or lesions	                LABS:                        15.0   6.68  )-----------( 210      ( 12 May 2019 07:52 )             46.5         144  |  108  |  16  ----------------------------<  83  3.7   |  27  |  0.78    Ca    9.1      12 May 2019 07:52    TPro  7.7  /  Alb  3.8  /  TBili  1.0  /  DBili  x   /  AST  27  /  ALT  11<L>  /  AlkPhos  59        Urinalysis Basic - ( 12 May 2019 09:05 )    Color: Yellow / Appearance: Clear / S.005 / pH: x  Gluc: x / Ketone: Negative  / Bili: Negative / Urobili: Negative mg/dL   Blood: x / Protein: 15 mg/dL / Nitrite: Negative   Leuk Esterase: Trace / RBC: 0-2 /HPF / WBC 0-2   Sq Epi: x / Non Sq Epi: x / Bacteria: Few      CAPILLARY BLOOD GLUCOSE              RADIOLOGY & ADDITIONAL TESTS:      < from: MR Lumbar Spine No Cont (19 @ 10:58) >  FINDINGS:  ALIGNMENT:  A thoracolumbar scoliosis is again noted. Also there is a   spondylolisthesis at L4-5.  VERTEBRAL BODIES:  No acute fracture or destructive lesion is noted.   There are discogenic endplate changes at multiple lower thoracic and   lumbar levels. This is most notable at T11-T12.  DISC SPACES: Advanced disc degeneration is noted in the lower thoracic   region from T10 to L1. Endplate changes are most prominent at T 11 T12.   There are bulging discs in the lower thoracic region with a small   protrusion at T12-L1.  L1-2:   A diffuse bulge is noted with broad-based sac effacement. Facets   and ligaments are thickened  L2-3:   A diffuse bulge is noted with more extensive facet and  ligamentous thickening. There is moderate stenosis. There is additional   disc prolapse in the right foramen  L3-4:   There is an anterolisthesis with moderate to severe stenosis.   There is diffuse bulging of the disc annulus with facet and ligamentous   hypertrophy.  L4-5:  There is a diffuse bulge with facet and ligamentous hypertrophy.   There is moderate stenosis.   L5-S1:   A small bulge is noted with mild degeneration. There is a small   annular tear.  POSTERIOR ELEMENTS:  Hypertrophic changes throughout the mid and lower   lumbar region  CANAL AND FORAMINA:  Diffuse stenosis is noted from L2 to L5  INTRADURAL SPACE:  No intradural abnormality.  The distal cord is   unremarkable in contour and signal.  MISCELLANEOUS:     Multiple renal cysts are identified.    IMPRESSION:        1. Thoracolumbar scoliosis.  2. Multilevel disc disease and degenerative changes both lower thoracic   and lumbar.  3. Diffuse stenosis noted from L2 to L5, most prominent at L3-4. No acute   fracture or destructive lesion noted.      < end of copied text >    Consultant(s) Notes Reviewed:  [ ] YES  [ ] NO
Patient is a 89y old  Female who presents with a chief complaint of Unable to walk with back and leg pain. (13 May 2019 12:21)      SUBJECTIVE/OBJECTIVE:    OOB in chair.  Patient resting comfortably.     MEDICATIONS  (STANDING):  diltiazem    milliGRAM(s) Oral daily  docusate sodium 100 milliGRAM(s) Oral three times a day  enoxaparin Injectable 40 milliGRAM(s) SubCutaneous daily  ibuprofen  Tablet. 600 milliGRAM(s) Oral two times a day  latanoprost 0.005% Ophthalmic Solution 1 Drop(s) Both EYES at bedtime  methylPREDNISolone   Oral   methylPREDNISolone 4 milliGRAM(s) Oral before breakfast  methylPREDNISolone 4 milliGRAM(s) Oral after lunch  methylPREDNISolone 4 milliGRAM(s) Oral after dinner  methylPREDNISolone 4 milliGRAM(s) Oral at bedtime  multivitamin 1 Tablet(s) Oral daily  pantoprazole    Tablet 40 milliGRAM(s) Oral before breakfast    MEDICATIONS  (PRN):  oxyCODONE    5 mG/acetaminophen 325 mG 1 Tablet(s) Oral every 4 hours PRN Moderate Pain (4 - 6)      Allergies    No Known Allergies    Intolerances          Vital Signs Last 24 Hrs  T(C): 36.1 (14 May 2019 11:32), Max: 37.1 (13 May 2019 17:25)  T(F): 97 (14 May 2019 11:32), Max: 98.7 (13 May 2019 17:25)  HR: 89 (14 May 2019 11:32) (66 - 93)  BP: 154/79 (14 May 2019 11:32) (106/60 - 161/82)  BP(mean): --  RR: 17 (14 May 2019 11:32) (17 - 18)  SpO2: 95% (14 May 2019 11:32) (95% - 98%)            Physical Exam:  Physical Exam: PHYSICAL EXAM:  GENERAL: NAD, well-groomed, well-developed  HEAD:  Atraumatic, Normocephalic  EYES: EOMI, PERRLA, conjunctiva and sclera clear  ENMT: No tonsillar erythema, exudates, or enlargement; Moist mucous membranes, No lesions  NECK: Supple, No JVD, Normal thyroid  NERVOUS SYSTEM:  Alert & Oriented X3; Motor Strength 4/5 LLE.  + L SLR  CHEST/LUNG: Clear bilaterally; No rales, rhonchi, wheezing, or rubs  HEART: Regular rate and rhythm; No murmurs, rubs, or gallops  ABDOMEN: Soft, Nontender, Nondistended; Bowel sounds present  EXTREMITIES:  2+ Peripheral Pulses, No clubbing, cyanosis, or edema  Back;  Tenderness L-S spine.  LYMPH: No lymphadenopathy noted SKIN: No rashes or lesions	          LABS:                        14.2   8.66  )-----------( 237      ( 14 May 2019 06:16 )             43.3     05-14    141  |  107  |  24<H>  ----------------------------<  103<H>  3.8   |  26  |  0.66    Ca    9.1      14 May 2019 06:16          CAPILLARY BLOOD GLUCOSE              RADIOLOGY & ADDITIONAL TESTS:        Consultant(s) Notes Reviewed:  [ ] YES  [ ] NO

## 2019-05-14 NOTE — PROGRESS NOTE ADULT - ASSESSMENT
Admitted Sciatic due to L-S spine stenosis with DDD, OA, HTN.
Admitted Sciatic due to L-S spine stenosis with DDD, OA, HTN.

## 2019-05-14 NOTE — DISCHARGE NOTE PROVIDER - NSDCCPCAREPLAN_GEN_ALL_CORE_FT
PRINCIPAL DISCHARGE DIAGNOSIS  Diagnosis: Sciatica associated with disorder of lumbosacral spine  Assessment and Plan of Treatment: PT  Pain management      SECONDARY DISCHARGE DIAGNOSES  Diagnosis: Essential hypertension  Assessment and Plan of Treatment: Medication    Diagnosis: Spinal stenosis of lumbar region, unspecified whether neurogenic claudication present  Assessment and Plan of Treatment: Pain Management  PT    Diagnosis: Other osteoarthritis of spine, lumbosacral region  Assessment and Plan of Treatment: As above    Diagnosis: Degenerative disc disease, lumbar  Assessment and Plan of Treatment: As above

## 2019-05-14 NOTE — PROGRESS NOTE ADULT - PROBLEM SELECTOR PROBLEM 1
Sciatica associated with disorder of lumbosacral spine
Sciatica associated with disorder of lumbosacral spine

## 2019-05-15 ENCOUNTER — TRANSCRIPTION ENCOUNTER (OUTPATIENT)
Age: 84
End: 2019-05-15

## 2019-05-15 VITALS
RESPIRATION RATE: 17 BRPM | OXYGEN SATURATION: 96 % | TEMPERATURE: 98 F | SYSTOLIC BLOOD PRESSURE: 152 MMHG | DIASTOLIC BLOOD PRESSURE: 70 MMHG | HEART RATE: 61 BPM

## 2019-05-15 RX ADMIN — Medication 4 MILLIGRAM(S): at 07:53

## 2019-05-15 RX ADMIN — ENOXAPARIN SODIUM 40 MILLIGRAM(S): 100 INJECTION SUBCUTANEOUS at 11:02

## 2019-05-15 RX ADMIN — OXYCODONE AND ACETAMINOPHEN 1 TABLET(S): 5; 325 TABLET ORAL at 02:28

## 2019-05-15 RX ADMIN — Medication 1 TABLET(S): at 11:02

## 2019-05-15 RX ADMIN — OXYCODONE AND ACETAMINOPHEN 1 TABLET(S): 5; 325 TABLET ORAL at 11:01

## 2019-05-15 RX ADMIN — Medication 4 MILLIGRAM(S): at 13:53

## 2019-05-15 RX ADMIN — Medication 600 MILLIGRAM(S): at 06:22

## 2019-05-15 RX ADMIN — PANTOPRAZOLE SODIUM 40 MILLIGRAM(S): 20 TABLET, DELAYED RELEASE ORAL at 07:53

## 2019-05-15 RX ADMIN — Medication 120 MILLIGRAM(S): at 06:22

## 2019-05-15 RX ADMIN — Medication 100 MILLIGRAM(S): at 06:22

## 2019-05-15 RX ADMIN — Medication 600 MILLIGRAM(S): at 07:15

## 2019-05-15 RX ADMIN — OXYCODONE AND ACETAMINOPHEN 1 TABLET(S): 5; 325 TABLET ORAL at 02:58

## 2019-05-15 RX ADMIN — OXYCODONE AND ACETAMINOPHEN 1 TABLET(S): 5; 325 TABLET ORAL at 11:55

## 2019-05-15 NOTE — DISCHARGE NOTE NURSING/CASE MANAGEMENT/SOCIAL WORK - NSDCDPATPORTLINK_GEN_ALL_CORE
You can access the [facility] Portal, offered by [facility], by registering with the following website: [email or web address]

## 2019-05-17 ENCOUNTER — APPOINTMENT (OUTPATIENT)
Dept: RADIOLOGY | Facility: HOSPITAL | Age: 84
End: 2019-05-17

## 2019-05-17 ENCOUNTER — OUTPATIENT (OUTPATIENT)
Dept: OUTPATIENT SERVICES | Facility: HOSPITAL | Age: 84
LOS: 1 days | Discharge: HOME | End: 2019-05-17
Payer: MEDICARE

## 2019-05-17 DIAGNOSIS — Z98.89 OTHER SPECIFIED POSTPROCEDURAL STATES: Chronic | ICD-10-CM

## 2019-05-17 DIAGNOSIS — Z98.49 CATARACT EXTRACTION STATUS, UNSPECIFIED EYE: Chronic | ICD-10-CM

## 2019-05-17 DIAGNOSIS — R93.89 ABNORMAL FINDINGS ON DIAGNOSTIC IMAGING OF OTHER SPECIFIED BODY STRUCTURES: ICD-10-CM

## 2019-05-17 DIAGNOSIS — Z96.652 PRESENCE OF LEFT ARTIFICIAL KNEE JOINT: Chronic | ICD-10-CM

## 2019-05-17 DIAGNOSIS — Z90.710 ACQUIRED ABSENCE OF BOTH CERVIX AND UTERUS: Chronic | ICD-10-CM

## 2019-05-17 PROBLEM — K25.9 GASTRIC ULCER, UNSPECIFIED AS ACUTE OR CHRONIC, WITHOUT HEMORRHAGE OR PERFORATION: Chronic | Status: ACTIVE | Noted: 2019-05-12

## 2019-05-17 PROCEDURE — 73552 X-RAY EXAM OF FEMUR 2/>: CPT | Mod: 26,LT

## 2019-05-21 DIAGNOSIS — Z98.41 CATARACT EXTRACTION STATUS, RIGHT EYE: ICD-10-CM

## 2019-05-21 DIAGNOSIS — I10 ESSENTIAL (PRIMARY) HYPERTENSION: ICD-10-CM

## 2019-05-21 DIAGNOSIS — M48.07 SPINAL STENOSIS, LUMBOSACRAL REGION: ICD-10-CM

## 2019-05-21 DIAGNOSIS — E78.00 PURE HYPERCHOLESTEROLEMIA, UNSPECIFIED: ICD-10-CM

## 2019-05-21 DIAGNOSIS — M51.17 INTERVERTEBRAL DISC DISORDERS WITH RADICULOPATHY, LUMBOSACRAL REGION: ICD-10-CM

## 2019-05-21 DIAGNOSIS — M47.27 OTHER SPONDYLOSIS WITH RADICULOPATHY, LUMBOSACRAL REGION: ICD-10-CM

## 2019-05-21 DIAGNOSIS — Z90.49 ACQUIRED ABSENCE OF OTHER SPECIFIED PARTS OF DIGESTIVE TRACT: ICD-10-CM

## 2019-05-21 DIAGNOSIS — Z96.652 PRESENCE OF LEFT ARTIFICIAL KNEE JOINT: ICD-10-CM

## 2019-05-21 DIAGNOSIS — Z79.899 OTHER LONG TERM (CURRENT) DRUG THERAPY: ICD-10-CM

## 2019-05-21 DIAGNOSIS — M81.0 AGE-RELATED OSTEOPOROSIS WITHOUT CURRENT PATHOLOGICAL FRACTURE: ICD-10-CM

## 2019-05-21 DIAGNOSIS — Z90.710 ACQUIRED ABSENCE OF BOTH CERVIX AND UTERUS: ICD-10-CM

## 2019-05-21 DIAGNOSIS — M19.90 UNSPECIFIED OSTEOARTHRITIS, UNSPECIFIED SITE: ICD-10-CM

## 2019-05-21 DIAGNOSIS — Z98.42 CATARACT EXTRACTION STATUS, LEFT EYE: ICD-10-CM

## 2019-05-21 DIAGNOSIS — Z90.721 ACQUIRED ABSENCE OF OVARIES, UNILATERAL: ICD-10-CM

## 2019-05-23 NOTE — ED CDU PROVIDER INITIAL DAY NOTE - ATTENDING CONTRIBUTION TO CARE
[Mother] : mother
89 y/o F with PMH AFib (no a/c), HTN, HLD, IBS, SBO, OA, presents after syncopal episode this morning while at home cleaning with normal work up in ed sent for tele monitoring, tte, observation.

## 2019-10-15 ENCOUNTER — RX RENEWAL (OUTPATIENT)
Age: 84
End: 2019-10-15

## 2019-11-07 ENCOUNTER — APPOINTMENT (OUTPATIENT)
Dept: CARDIOLOGY | Facility: CLINIC | Age: 84
End: 2019-11-07
Payer: MEDICARE

## 2019-11-07 ENCOUNTER — NON-APPOINTMENT (OUTPATIENT)
Age: 84
End: 2019-11-07

## 2019-11-07 VITALS
HEIGHT: 60 IN | WEIGHT: 142 LBS | SYSTOLIC BLOOD PRESSURE: 130 MMHG | HEART RATE: 65 BPM | DIASTOLIC BLOOD PRESSURE: 78 MMHG | OXYGEN SATURATION: 95 % | BODY MASS INDEX: 27.88 KG/M2

## 2019-11-07 DIAGNOSIS — Z90.49 ACQUIRED ABSENCE OF OTHER SPECIFIED PARTS OF DIGESTIVE TRACT: ICD-10-CM

## 2019-11-07 PROCEDURE — 99214 OFFICE O/P EST MOD 30 MIN: CPT

## 2019-11-07 PROCEDURE — 93000 ELECTROCARDIOGRAM COMPLETE: CPT

## 2019-11-07 RX ORDER — DICLOFENAC SODIUM 50 MG/1
50 TABLET, DELAYED RELEASE ORAL
Refills: 0 | Status: DISCONTINUED | COMMUNITY
End: 2019-11-07

## 2019-11-07 RX ORDER — DIPHENOXYLATE HYDROCHLORIDE AND ATROPINE SULFATE 2.5; .025 MG/1; MG/1
2.5-0.025 TABLET ORAL
Refills: 0 | Status: ACTIVE | COMMUNITY
Start: 2019-11-07

## 2019-11-07 RX ORDER — MISOPROSTOL 100 UG/1
100 TABLET ORAL
Refills: 0 | Status: ACTIVE | COMMUNITY
Start: 2019-11-07

## 2019-11-07 NOTE — REASON FOR VISIT
[Atrial Fibrillation] : atrial fibrillation [Follow-Up - Clinic] : a clinic follow-up of [Hyperlipidemia] : hyperlipidemia [Hypertension] : hypertension [Palpitations] : palpitations [Supraventricular Tachycardia] : supraventricular tachycardia

## 2019-11-20 NOTE — HISTORY OF PRESENT ILLNESS
[FreeTextEntry1] : Patient is an 89-year-old female with history of hypertension (medically managed), hyperlipidemia (stable - diet controlled), small bowel obstruction status post colon resection secondary to adhesions, GI Bleed(7/19/2015) - now stable, status post bilateral knee replacement presented to follow up post recent hospitalization for possible atrial flutter with rapid ventricular response - has been iin NSR since then. \par \par Patient initially presented to [facility] on July 7, 2014 for right total knee replacement which was initially canceled due to her palpitations, however done later that same day. Patient states that her palpitations were associated with chest discomfort, shortness of breath and mild dizziness. In [place] patient has been undergoing rehabilitation post total knee replacement and was noted to complain of palpitations and sent to the emergency room on July 14. In the emergency room patient was found to be in supraventricular tachycardia and transferred to CCU at [place].\par \par  Of note, post the TKR she was placed on Xarelto for DVT prophylaxis and upon readmission for the SVT had a course c/b GI bleed, transferred to MICU, endoscopy on 7/19/14 revealing a gastric ulcer with clean bases and was treated with IV Protonix. Based upon the discussion with the daughter, patient was medically stabilized on Amiodarone and Diltiazem and no ablation was done. \par \par Today, the patient feels well-denies chest pain, shortness of breath,  dizziness, lightheadedness or syncope. On rare occasions, she experiences some transient "fluttering"  that lasts a second or two only. \par No long lasting palpitations since being placed on Amio and Diltiazem.\par \par Since her last visit, patient underwent an endoscopy and was found to have complete resolution of her gastric ulcer.\par \par Interval Note\par November 7, 2019\par \par Patient returns for a follow up visit. Blood pressure is normotensive. EKG is stable and unchanged.\par Macular degeneration.\par \par Lives alone with a  5 hours per day. She can stil can walk 10 blocks daily.

## 2019-11-20 NOTE — PHYSICAL EXAM
[General Appearance - Well Developed] : well developed [Normal Appearance] : normal appearance [Well Groomed] : well groomed [General Appearance - Well Nourished] : well nourished [No Deformities] : no deformities [General Appearance - In No Acute Distress] : no acute distress [Normal Conjunctiva] : the conjunctiva exhibited no abnormalities [Eyelids - No Xanthelasma] : the eyelids demonstrated no xanthelasmas [No Oral Pallor] : no oral pallor [Normal Oral Mucosa] : normal oral mucosa [No Oral Cyanosis] : no oral cyanosis [Normal Jugular Venous A Waves Present] : normal jugular venous A waves present [Normal Jugular Venous V Waves Present] : normal jugular venous V waves present [No Jugular Venous Pate A Waves] : no jugular venous pate A waves [Respiration, Rhythm And Depth] : normal respiratory rhythm and effort [Exaggerated Use Of Accessory Muscles For Inspiration] : no accessory muscle use [Auscultation Breath Sounds / Voice Sounds] : lungs were clear to auscultation bilaterally [Heart Sounds] : normal S1 and S2 [Heart Rate And Rhythm] : heart rate and rhythm were normal [Murmurs] : no murmurs present [Abdomen Soft] : soft [Abdomen Tenderness] : non-tender [Abdomen Mass (___ Cm)] : no abdominal mass palpated [Gait - Sufficient For Exercise Testing] : the gait was sufficient for exercise testing [Abnormal Walk] : normal gait [Nail Clubbing] : no clubbing of the fingernails [Cyanosis, Localized] : no localized cyanosis [Petechial Hemorrhages (___cm)] : no petechial hemorrhages [Skin Color & Pigmentation] : normal skin color and pigmentation [] : no rash [No Venous Stasis] : no venous stasis [Skin Lesions] : no skin lesions [No Skin Ulcers] : no skin ulcer [No Xanthoma] : no  xanthoma was observed [Oriented To Time, Place, And Person] : oriented to person, place, and time [Mood] : the mood was normal [Affect] : the affect was normal [No Anxiety] : not feeling anxious [FreeTextEntry1] : b/l ankle edema

## 2019-11-20 NOTE — DISCUSSION/SUMMARY
Patient attended session 23 of outpatient Phase 2 cardiac rehab. See scanned in exercise session report in media tab.   [Patient] : the patient [Risks] : risks [___ Month(s)] : [unfilled] month(s) [With Me] : with me [FreeTextEntry1] : Patient is an 89-year-old female with history of hypertension (medically managed), hyperlipidemia (stble - diet controlled), small bowel obstruction status post colon resection secondary to adhesions, GI Bleed(7/19/2015) - now stable, status post bilateral knee replacement presented to follow up post recent hospitalization for possible atrial flutter with rapid ventricular response - has been iin NSR since then. \par - Patient is presently found to be in normal sinus rhythm. Patient will be continued on the current regimen off of amiodarone and diltiazem. \par - low sodium diet was encouraged\par - patient was referred for routine blood work\par - BP stable\par - ECG with no new changes

## 2020-03-04 ENCOUNTER — APPOINTMENT (OUTPATIENT)
Dept: ORTHOPEDIC SURGERY | Facility: CLINIC | Age: 85
End: 2020-03-04

## 2020-03-23 ENCOUNTER — APPOINTMENT (OUTPATIENT)
Dept: ORTHOPEDIC SURGERY | Facility: CLINIC | Age: 85
End: 2020-03-23

## 2020-07-14 ENCOUNTER — RX RENEWAL (OUTPATIENT)
Age: 85
End: 2020-07-14

## 2020-08-26 NOTE — ED CDU PROVIDER INITIAL DAY NOTE - CARDIAC, MLM
This is a recent snapshot of the patient's [facility] medical record.  For current drug dose and complete information and questions, call [phone] or In Basket pool, [facility] home infusion ([id number])  CSN Number:  [id number]       Normal rate, regular rhythm.  Heart sounds S1, S2.  No murmurs, rubs or gallops.

## 2020-10-12 ENCOUNTER — APPOINTMENT (OUTPATIENT)
Dept: CARDIOLOGY | Facility: CLINIC | Age: 85
End: 2020-10-12
Payer: MEDICARE

## 2020-10-12 ENCOUNTER — RX RENEWAL (OUTPATIENT)
Age: 85
End: 2020-10-12

## 2020-10-12 ENCOUNTER — NON-APPOINTMENT (OUTPATIENT)
Age: 85
End: 2020-10-12

## 2020-10-12 VITALS
HEART RATE: 89 BPM | SYSTOLIC BLOOD PRESSURE: 108 MMHG | HEIGHT: 60 IN | WEIGHT: 130 LBS | DIASTOLIC BLOOD PRESSURE: 70 MMHG | BODY MASS INDEX: 25.52 KG/M2 | OXYGEN SATURATION: 95 %

## 2020-10-12 VITALS — WEIGHT: 144 LBS | BODY MASS INDEX: 28.12 KG/M2

## 2020-10-12 PROCEDURE — 93000 ELECTROCARDIOGRAM COMPLETE: CPT

## 2020-10-12 PROCEDURE — 99214 OFFICE O/P EST MOD 30 MIN: CPT

## 2020-10-12 NOTE — DISCUSSION/SUMMARY
[Patient] : the patient [Risks] : risks [___ Month(s)] : [unfilled] month(s) [With Me] : with me [FreeTextEntry1] : Patient is an 89-year-old female with history of hypertension (medically managed), hyperlipidemia (stble - diet controlled), small bowel obstruction status post colon resection secondary to adhesions, GI Bleed(7/19/2015) - now stable, status post bilateral knee replacement presented to follow up post recent hospitalization for possible atrial flutter with rapid ventricular response - has been iin NSR since then. \par - Patient is presently found to be in normal sinus rhythm. Patient will be continued on the current regimen off of amiodarone and diltiazem. \par - low sodium diet was encouraged\par - patient was referred for routine blood work - recently had done blood work with PCP - will obtain \par - BP stable\par - ECG with no new changes

## 2020-10-12 NOTE — PHYSICAL EXAM
[General Appearance - Well Developed] : well developed [Normal Appearance] : normal appearance [Well Groomed] : well groomed [General Appearance - Well Nourished] : well nourished [No Deformities] : no deformities [General Appearance - In No Acute Distress] : no acute distress [Normal Conjunctiva] : the conjunctiva exhibited no abnormalities [Eyelids - No Xanthelasma] : the eyelids demonstrated no xanthelasmas [Normal Oral Mucosa] : normal oral mucosa [No Oral Pallor] : no oral pallor [No Oral Cyanosis] : no oral cyanosis [Normal Jugular Venous A Waves Present] : normal jugular venous A waves present [Normal Jugular Venous V Waves Present] : normal jugular venous V waves present [No Jugular Venous Pate A Waves] : no jugular venous pate A waves [Respiration, Rhythm And Depth] : normal respiratory rhythm and effort [Exaggerated Use Of Accessory Muscles For Inspiration] : no accessory muscle use [Auscultation Breath Sounds / Voice Sounds] : lungs were clear to auscultation bilaterally [Heart Rate And Rhythm] : heart rate and rhythm were normal [Heart Sounds] : normal S1 and S2 [Murmurs] : no murmurs present [Abdomen Soft] : soft [Abdomen Tenderness] : non-tender [Abdomen Mass (___ Cm)] : no abdominal mass palpated [Abnormal Walk] : normal gait [Gait - Sufficient For Exercise Testing] : the gait was sufficient for exercise testing [Nail Clubbing] : no clubbing of the fingernails [Cyanosis, Localized] : no localized cyanosis [Petechial Hemorrhages (___cm)] : no petechial hemorrhages [Skin Color & Pigmentation] : normal skin color and pigmentation [] : no rash [No Venous Stasis] : no venous stasis [Skin Lesions] : no skin lesions [No Skin Ulcers] : no skin ulcer [No Xanthoma] : no  xanthoma was observed [Oriented To Time, Place, And Person] : oriented to person, place, and time [Affect] : the affect was normal [Mood] : the mood was normal [No Anxiety] : not feeling anxious [FreeTextEntry1] : b/l ankle edema

## 2020-10-12 NOTE — HISTORY OF PRESENT ILLNESS
[FreeTextEntry1] : Patient is an 89-year-old female with history of hypertension (medically managed), hyperlipidemia (stable - diet controlled), small bowel obstruction status post colon resection secondary to adhesions, GI Bleed(7/19/2015) - now stable, status post bilateral knee replacement presented to follow up post recent hospitalization for possible atrial flutter with rapid ventricular response - has been iin NSR since then. \par \par Patient initially presented to [facility] on July 7, 2014 for right total knee replacement which was initially canceled due to her palpitations, however done later that same day. Patient states that her palpitations were associated with chest discomfort, shortness of breath and mild dizziness. In [place] patient has been undergoing rehabilitation post total knee replacement and was noted to complain of palpitations and sent to the emergency room on July 14. In the emergency room patient was found to be in supraventricular tachycardia and transferred to CCU at [place].\par \par  Of note, post the TKR she was placed on Xarelto for DVT prophylaxis and upon readmission for the SVT had a course c/b GI bleed, transferred to MICU, endoscopy on 7/19/14 revealing a gastric ulcer with clean bases and was treated with IV Protonix. Based upon the discussion with the daughter, patient was medically stabilized on Amiodarone and Diltiazem and no ablation was done. \par \par Today, the patient feels well-denies chest pain, shortness of breath,  dizziness, lightheadedness or syncope. On rare occasions, she experiences some transient "fluttering"  that lasts a second or two only. \par No long lasting palpitations since being placed on Amio and Diltiazem.\par \par Since her last visit, patient underwent an endoscopy and was found to have complete resolution of her gastric ulcer.\par \par Interval Note\par November 7, 2019\par \par Patient returns for a follow up visit. Blood pressure is normotensive. EKG is stable and unchanged.\par Macular degeneration.\par \par Lives alone with a  5 hours per day. She can stil can walk 10 blocks daily.

## 2021-01-11 ENCOUNTER — RX RENEWAL (OUTPATIENT)
Age: 86
End: 2021-01-11

## 2021-04-26 ENCOUNTER — OUTPATIENT (OUTPATIENT)
Dept: OUTPATIENT SERVICES | Facility: HOSPITAL | Age: 86
LOS: 1 days | Discharge: HOME | End: 2021-04-26

## 2021-04-26 DIAGNOSIS — Z98.89 OTHER SPECIFIED POSTPROCEDURAL STATES: Chronic | ICD-10-CM

## 2021-04-26 DIAGNOSIS — Z90.710 ACQUIRED ABSENCE OF BOTH CERVIX AND UTERUS: Chronic | ICD-10-CM

## 2021-04-26 DIAGNOSIS — Z96.652 PRESENCE OF LEFT ARTIFICIAL KNEE JOINT: Chronic | ICD-10-CM

## 2021-04-26 DIAGNOSIS — Z98.49 CATARACT EXTRACTION STATUS, UNSPECIFIED EYE: Chronic | ICD-10-CM

## 2021-04-29 DIAGNOSIS — F06.4 ANXIETY DISORDER DUE TO KNOWN PHYSIOLOGICAL CONDITION: ICD-10-CM

## 2021-04-29 DIAGNOSIS — G30.1 ALZHEIMER'S DISEASE WITH LATE ONSET: ICD-10-CM

## 2021-08-30 ENCOUNTER — APPOINTMENT (OUTPATIENT)
Dept: CARDIOLOGY | Facility: CLINIC | Age: 86
End: 2021-08-30

## 2021-12-06 ENCOUNTER — RX RENEWAL (OUTPATIENT)
Age: 86
End: 2021-12-06

## 2021-12-13 ENCOUNTER — RX RENEWAL (OUTPATIENT)
Age: 86
End: 2021-12-13

## 2022-02-14 ENCOUNTER — NON-APPOINTMENT (OUTPATIENT)
Age: 87
End: 2022-02-14

## 2022-02-14 ENCOUNTER — APPOINTMENT (OUTPATIENT)
Dept: CARDIOLOGY | Facility: CLINIC | Age: 87
End: 2022-02-14
Payer: MEDICARE

## 2022-02-14 VITALS
OXYGEN SATURATION: 96 % | WEIGHT: 140 LBS | HEART RATE: 81 BPM | SYSTOLIC BLOOD PRESSURE: 118 MMHG | HEIGHT: 60 IN | DIASTOLIC BLOOD PRESSURE: 80 MMHG | BODY MASS INDEX: 27.48 KG/M2

## 2022-02-14 PROCEDURE — 93000 ELECTROCARDIOGRAM COMPLETE: CPT

## 2022-02-14 PROCEDURE — 99214 OFFICE O/P EST MOD 30 MIN: CPT

## 2022-02-14 NOTE — HISTORY OF PRESENT ILLNESS
[FreeTextEntry1] : Patient is an 89-year-old female with history of hypertension (medically managed), hyperlipidemia (stable - diet controlled), small bowel obstruction status post colon resection secondary to adhesions, GI Bleed(7/19/2015) - now stable, status post bilateral knee replacement presented to follow up post recent hospitalization for possible atrial flutter with rapid ventricular response - has been iin NSR since then. \par \par Patient initially presented to [facility] on July 7, 2014 for right total knee replacement which was initially canceled due to her palpitations, however done later that same day. Patient states that her palpitations were associated with chest discomfort, shortness of breath and mild dizziness. In [place] patient has been undergoing rehabilitation post total knee replacement and was noted to complain of palpitations and sent to the emergency room on July 14. In the emergency room patient was found to be in supraventricular tachycardia and transferred to CCU at [place].\par \par  Of note, post the TKR she was placed on Xarelto for DVT prophylaxis and upon readmission for the SVT had a course c/b GI bleed, transferred to MICU, endoscopy on 7/19/14 revealing a gastric ulcer with clean bases and was treated with IV Protonix. Based upon the discussion with the daughter, patient was medically stabilized on Amiodarone and Diltiazem and no ablation was done. \par \par Today, the patient feels well-denies chest pain, shortness of breath,  dizziness, lightheadedness or syncope. On rare occasions, she experiences some transient "fluttering"  that lasts a second or two only. \par No long lasting palpitations since being placed on Amio and Diltiazem.\par \par Since her last visit, patient underwent an endoscopy and was found to have complete resolution of her gastric ulcer.\par \par Interval Note\par November 7, 2019\par \par Patient returns for a follow up visit. Blood pressure is normotensive. EKG is stable and unchanged.\par Macular degeneration.\par \par Lives alone with a  5 hours per day. She can stil can walk 10 blocks daily.

## 2022-02-14 NOTE — DISCUSSION/SUMMARY
[Patient] : the patient [Risks] : risks [With Me] : with me [___ Month(s)] : in [unfilled] month(s) [FreeTextEntry1] : Patient is an 89-year-old female with history of hypertension (medically managed), hyperlipidemia (stble - diet controlled), small bowel obstruction status post colon resection secondary to adhesions, GI Bleed(7/19/2015) - now stable, status post bilateral knee replacement presented to follow up post recent hospitalization for possible atrial flutter with rapid ventricular response - has been iin NSR since then. \par - Patient is presently found to be in normal sinus rhythm. Patient will be continued on the current regimen off of amiodarone and diltiazem. \par - low sodium diet was encouraged\par - patient was referred for routine blood work - recently had done blood work with PCP - will obtain \par - BP stable\par - ECG with no new changes\par - Encouraged patient to continue healthy exercise and eating habits, focusing on a Mediterranean style of eating and aiming for the recommended 150 minutes per week of moderate physical activity.\par - Encouraged the patient to find healthy outlets and coping mechanisms to help manage stress, such as physical activity/exercise, reducing workload if possible, spending time with family and friends, engaging in an enjoyable hobby, or using meditation or mindfulness techniques.\par \par \par

## 2022-03-10 ENCOUNTER — RX RENEWAL (OUTPATIENT)
Age: 87
End: 2022-03-10

## 2022-06-06 NOTE — DISCHARGE NOTE PROVIDER - NSTOBACCOUSAGEY/N_GEN_A_CS
[1:26 PM] Magdalena Avalos  MRN: [id number]   r/s to next week covid + today     it says 4;30 but she is coming at 11:30    [1:29 PM] Carrie johnson mrn [phone] is r/s thanks       No

## 2022-07-26 ENCOUNTER — RX RENEWAL (OUTPATIENT)
Age: 87
End: 2022-07-26

## 2022-09-08 ENCOUNTER — RESULT REVIEW (OUTPATIENT)
Age: 87
End: 2022-09-08

## 2022-10-19 ENCOUNTER — TRANSCRIPTION ENCOUNTER (OUTPATIENT)
Age: 87
End: 2022-10-19

## 2023-04-04 NOTE — ED ADULT NURSE NOTE - CAS ELECT INFOMATION PROVIDED
[FreeTextEntry1] : Patient seen and examined\par Alert, awake , Oriented X 3. Speech clear and fluent\par PERRLA, EOMI, VFF\par Mild NLF flattening. Tongue protrudes midline\par LOYOLA x 4 with good strength -no LE weakness appreciated on exam.\par FFM, coordination equal bilaterally\par Sensation intact bilaterally\par Gait steady. Ambulating independently.  FANG Wild/JASWINDER instructions

## 2023-04-24 NOTE — ED PROVIDER NOTE - RATE
Jammie standard cataract  SURGEON:  Boni Agudelo MD  Assistant:  [phone] One Touch EMR [facility]  Left 1 Implanted        63

## 2025-01-30 NOTE — PATIENT PROFILE ADULT - SURGICAL SITE INCISION
Anesthesia Pre Eval Note    Anesthesia ROS/Med Hx    Overall Review:  EKG was reviewed and Echo was reviewed     Anesthetic Complication History:    Patient does not have a history of anesthetic complications      Pulmonary Review:    Positive for sleep apnea - no treatment  Negative for recent URI   The patient is a former smoker.  (Quit 1968)    Neuro/Psych Review:       Positive for CVA - no residual deficits    Cardiovascular Review:   Comments: 7/30/24 TTE-  Interpretation Summary   The left ventricle is normal in size.   Left ventricular systolic function is normal.   Ejection Fraction = 60-65%.   There is borderline concentric left ventricular hypertrophy.   Normal diastolic function for age.   The right ventricle is mildly dilated.   The right ventricular systolic function is normal.   Normal left atrial size   IVC within normal limits, consistent with normal right atrial pressure.   There is trace tricuspid regurgitation.   Right ventricular systolic pressure cannot be estimated on this examination.   The aortic valve is trileaflet and appears to be sclerotic.   No hemodynamically significant valvular aortic stenosis.   Trace of aortic regurgitation.   The ascending aorta is dilated with a maximal diameter of 4.7cm.       Negative for past MI  Positive for CAD  Negative for angina  Positive for dysrhythmias - Paroxysmal A-fib and Hx of SVT  Positive for ACE/ SOB  Positive for hypertension  Positive for hyperlipidemia    GI/HEPATIC/RENAL Review:   Positive for hiatal hernia  Positive for GERD - well controlled    End/Other Review:  Comments: Glaucoma  Positive for obesity class I - 30.00 - 34.99  Positive for thrombocytopenia  Additional Results:      ALLERGIES:   -- Lumigan [Bimatoprost] -- Other (See Comments)    --  Eyes:  hyperemia   -- Latanoprost -- Other (See Comments)    --  Eyes:  hyperemia   Past Medical History:  No date: AF (atrial fibrillation)  (CMD)  03/04/2015: Aortic ectasia (CMD)       Comment:  Chest CT: Mildly dilated ascending thoracic aorta 4.2                cm. Stable.  No date: BPH (benign prostatic hypertrophy)  No date: Celiac disease (CMD)  03/23/2006: Cervical disc disease  No date: Coronary artery disease  No date: Dry eye syndrome  No date: Dyslipidemia  No date: Hard of hearing  05/29/2014: Hiatal hernia  1987: History of CVA (cerebrovascular accident)      Comment:  denies states had an aneurysm  No date: HTN (hypertension)  No date: Low HDL (under 40)  No date: Moderate stage glaucoma  No date: Pneumonia  No date: Pseudophakia of both eyes      Comment:  w/ PC IOL, OU  09/17/2014: Rupture of tendon  No date: Sleep apnea      Comment:  Mild, no CPAP  No date: Tachycardia  No date: Thoracic aortic aneurysm (CMD)  Past Surgical History:  No date: Appendectomy  1990: Bowel resection      Comment:  Partial bowel resection at the time of appendectomy  1987: Brain aneurysm surgery      Comment:  Cerebral aneurysm without rupture, had clipping done at                [facility] 1987  No date: Cardiac electrophysiology mapping and ablation      Comment:  12/2024 2010: Cardiac stress test complete      Comment:  Normal, [facility]  12/27/2024: Cardiac surgery  No date: Cataract extraction w/  intraocular lens implant; Bilateral      Comment:  OS: June 25, 2007   OD: Feb 14, 2011 2000: Ct cardiac calcium scoring      Comment:  Elevated score 1280, started on aspirin and statin  12/12/2006: Ectropion repair      Comment:  BLL lateral tarsal strip/ BLL allograft        Dr Simmons  No date: Esophagogastroduodenoscopy      Comment:  With dilation  01/09/2024: Fix ectropion,entensv lid repair; Bilateral      Comment:  s/p Bilateral lateral tarsal strip extensive repair of                ectropion; bilateral lower lid myocutaneous cheek flap                (SOOF) done on 01/09/2024  By   No date: Hand surgery      Comment:  Thumb  04/07/2014: Hernia repair      Comment:   Umbilical, and inguinal  10/29/2012: Meniscectomy; Left  10/10/2024: Part remv vitreous,ant apprch; Left      Comment:  Pars plana vitrectomy, internal limiting membrane peel,                and air-fluid exchange left eye.    Dr Andrea  No date: Total knee replacement   Prior to Admission medications :  Medication omeprazole (PrilOSEC) 20 MG capsule, Sig TAKE 1 CAPSULE BY MOUTH ONCE DAY 1 WEEK PRIOR AND 1 MONTH POST ABLATION, Start Date 12/9/24, End Date , Taking? Yes, Authorizing Provider Provider, Outside    Medication brimonidine (ALPHAGAN) 0.2 % ophthalmic solution, Sig Place 1 drop into right eye in the morning and 1 drop in the evening., Start Date 1/2/25, End Date , Taking? Yes, Authorizing Provider Estevan Arevalo MD    Medication AMIODarone (PACERONE) 200 MG tablet, Sig Take 200 mg by mouth daily., Start Date 10/9/24, End Date 4/7/25, Taking? Yes, Authorizing Provider Provider, Outside    Medication hydroxychloroquine (PLAQUENIL) 200 MG tablet, Sig Take 200 mg by mouth in the morning and 200 mg in the evening., Start Date 8/1/24, End Date , Taking? Yes, Authorizing Provider Provider, Outside    Medication Multiple Vitamin (MULTI VITAMIN DAILY PO), Sig Take by mouth daily., Start Date , End Date , Taking? Yes, Authorizing Provider Provider, Outside    Medication apixaBAN (ELIQUIS) 5 MG Tab, Sig Take 5 mg by mouth every 12 hours., Start Date 7/19/24, End Date , Taking? Yes, Authorizing Provider Provider, Outside    Medication tamsulosin (FLOMAX) 0.4 MG Cap, Sig Take 0.4 mg by mouth daily after a meal., Start Date 7/18/24, End Date , Taking? Yes, Authorizing Provider Provider, Outside    Medication pravastatin (PRAVACHOL) 20 MG tablet, Sig Take 20 mg by mouth daily., Start Date 2/28/24, End Date , Taking? Yes, Authorizing Provider Provider, Outside    Medication timolol (TIMOPTIC) 0.5 % ophthalmic solution, Sig INSTILL 1 DROP IN BOTH EYES DAILY, Start Date 12/7/23, End Date , Taking? Yes, Authorizing  Provider Estevan Arevalo MD    Medication folic acid (FOLATE) 1 MG tablet, Sig Take 1,000 mcg by mouth daily., Start Date 10/12/23, End Date , Taking? Yes, Authorizing Provider Provider, Outside    Medication furosemide (LASIX) 20 MG tablet, Sig 20 mg daily., Start Date 10/12/23, End Date , Taking? Yes, Authorizing Provider Provider, Outside    Medication Glucosamine HCl 500 MG Tab, Sig Take 500 mg by mouth daily., Start Date , End Date , Taking? Yes, Authorizing Provider Provider, Outside    Medication Cholecalciferol (VITAMIN D3) 10 mcg (400 units) capsule, Sig Take 1 capsule by mouth daily. winter, Start Date 12/8/14, End Date , Taking? Yes, Authorizing Provider Provider, Outside    Medication fenofibrate 160 MG tablet, Sig Take 160 mg by mouth nightly. , Start Date 10/29/19, End Date , Taking? Yes, Authorizing Provider Provider, Outside    Medication amLODIPine (NORVASC) 5 MG tablet, Sig Take 5 mg by mouth nightly. , Start Date 3/28/19, End Date , Taking? Yes, Authorizing Provider Provider, Outside    Medication aspirin 81 MG tablet, Sig Take 81 mg by mouth daily., Start Date , End Date , Taking? Yes, Authorizing Provider Provider, Outside    Medication prednisoLONE acetate (PRED FORTE) 1 % ophthalmic suspension, Sig Place 1 drop into right eye 4 times daily. Begin after surgery as instructed, bring drops with you to surgery, and continue until gone.  Patient not taking: Reported on 1/2/2025, Start Date 11/22/24, End Date , Taking? , Authorizing Provider Davin Andrea MD    Medication losartan (COZAAR) 100 MG tablet, Sig Take 100 mg by mouth nightly. , Start Date , End Date , Taking? , Authorizing Provider Provider, Outside            Physical Exam     Airway   Mallampati: II  TM Distance: >3 FB  Neck ROM: Full  Neck: Thick and Able to place in sniff position    Cardiovascular    Cardio Rhythm: Irregular  Cardio Rate: Normal    General Assessment  General Assessment: Alert and oriented and No acute  distress    Dental Exam  Dental exam normal    Pulmonary Exam  Pulmonary exam normal  Breath sounds clear to auscultation:  Yes    Abdominal Exam    Patient Demonstrates:  Obese      Anesthesia Plan:    ASA Status: 3  Anesthesia Type: MAC    Induction: Intravenous  Checklist  Reviewed: Lab Results, Patient Summary, Care Everywhere, Allergies, Past Med History, Nursing Notes, Medications, Consultations, Outside Records, NPO Status and Problem list  Consent/Risks Discussed Statement:  The proposed anesthetic plan, including its risks and benefits, have been discussed with the Patient along with the risks and benefits of alternatives. Questions were encouraged and answered and the patient and/or representative understands and agrees to proceed.        I discussed with the patient (and/or patient's legal representative) the risks and benefits of the proposed anesthesia plan, MAC, which may include services performed by other anesthesia providers.    Alternative anesthesia plans, if available, were reviewed with the patient (and/or patient's legal representative). Discussion has been held with the patient (and/or patient's legal representative) regarding risks of anesthesia, which include allergic reaction, conversion to general anesthesia and intra-operative awareness and emergent situations that may require change in anesthesia plan.    The patient (and/or patient's legal representative) has indicated understanding, his/her questions have been answered, and he/she wishes to proceed with the planned anesthetic.     no

## 2025-03-18 NOTE — ED ADULT NURSE NOTE - TEMPLATE LIST FOR HEAD TO TOE ASSESSMENT
Orthopedic Surgery Office Visit    Nisa is a 74 year old female here for Right total hip arthroplasty. Patient is doing very well.    Exam:  Right hip and lower extremity:  Wound- Clean, dry, intact, nonerythematous, and without sign of infection.  Swelling- None at hip  Motion/Motor- lower extremity - Smooth passive hip range of motion within normal postoperative comfort limits without instability. Active ankle dorsiflexion and plantarflexion intact.  Sensory- Normal.  Vascular- Normal.  Other- Calf (left)- Soft and non tender..        Studies:  N/A    I directly visualized and independently interpreted the abovementioned images.     Assessment and Plan:  S/p right total hip arthroplasty, 3 months. Doing well    Wound: Reinforced and taught per our applicable [employer] [facility] protocol. OK to submerge incision as desired. Direct scar massage encouraged with scar creams/lotions.  Restrictions / weight bearing: Reinforced and taught per our applicable [employer] [facility] protocol. Weight bear as tolerated.  Exercises: Reinforced and taught per our applicable [employer] [facility] protocol.  Other: N/A.  Follow up: As needed; they were encouraged to call us with questions or concerns.          Phoenix Aleman MD  [facility]  [address] [facility] [address]  Dept: [phone]  Dept Fax: [phone]            On 3/18/2025, I, Carlos Montes De Oca PA-C scribed the services personally performed by Phoenix Aleman MD.    The documentation recorded by the scribe accurately and completely reflects the service(s) I personally performed and the decisions made by me, Phoenix Aleman MD.   General